# Patient Record
Sex: FEMALE | Race: WHITE | NOT HISPANIC OR LATINO | Employment: UNEMPLOYED | URBAN - METROPOLITAN AREA
[De-identification: names, ages, dates, MRNs, and addresses within clinical notes are randomized per-mention and may not be internally consistent; named-entity substitution may affect disease eponyms.]

---

## 2018-09-21 ENCOUNTER — TRANSFERRED RECORDS (OUTPATIENT)
Dept: HEALTH INFORMATION MANAGEMENT | Facility: CLINIC | Age: 63
End: 2018-09-21

## 2018-09-26 ENCOUNTER — TRANSFERRED RECORDS (OUTPATIENT)
Dept: HEALTH INFORMATION MANAGEMENT | Facility: CLINIC | Age: 63
End: 2018-09-26

## 2018-10-05 ENCOUNTER — TRANSFERRED RECORDS (OUTPATIENT)
Dept: HEALTH INFORMATION MANAGEMENT | Facility: CLINIC | Age: 63
End: 2018-10-05

## 2018-10-19 ENCOUNTER — TRANSFERRED RECORDS (OUTPATIENT)
Dept: HEALTH INFORMATION MANAGEMENT | Facility: CLINIC | Age: 63
End: 2018-10-19

## 2018-11-05 ENCOUNTER — OFFICE VISIT (OUTPATIENT)
Dept: SURGERY | Facility: CLINIC | Age: 63
End: 2018-11-05
Payer: COMMERCIAL

## 2018-11-05 VITALS
WEIGHT: 112 LBS | TEMPERATURE: 99.4 F | HEART RATE: 100 BPM | HEIGHT: 66 IN | SYSTOLIC BLOOD PRESSURE: 140 MMHG | DIASTOLIC BLOOD PRESSURE: 86 MMHG | BODY MASS INDEX: 18 KG/M2

## 2018-11-05 DIAGNOSIS — D05.11 DUCTAL CARCINOMA IN SITU (DCIS) OF RIGHT BREAST: Primary | ICD-10-CM

## 2018-11-05 PROCEDURE — 99205 OFFICE O/P NEW HI 60 MIN: CPT | Performed by: SURGERY

## 2018-11-05 NOTE — LETTER
11/5/2018         RE: Jackie Urias  [address] [place] [address]        Dear Colleague,    Thank you for referring your patient, Jackie Urias, to the [facility]. Please see a copy of my visit note below.    PCP:  Bernice Dennis    Chief complaint: Ductal carcinoma in situ right breast    History of Present Illness: Patient is a 62-year-old female has an interesting and difficult history. Approximately 25 years ago she had bilateral saline breast implants.    Nine years ago she had breast cancer on the left side which was treated with lumpectomy, radiation, and chemotherapy because she had a positive lymph node. Axillary dissection was not done if I recall correctly.    Recently she had a screening mammogram done with the finding of a grouping of microcalcifications in the right breast upper outer quadrant. Biopsy showed a ductal carcinoma in situ once again. She had surgery at [facility]. The first lumpectomy specimen came back with positive medial and deep margins.    A second excision was done of the entire biopsy cavity, but unfortunately the deep margin is still positive. I was able to review all of the notes. It appears that the deep margin is actually right on the capsule of the implant.    The patient came to this institution for another opinion. She was a little confused as to what had happened to her up to this point.    So far, there has been no invasive cancer noted.    An MRI scan of the breast was done prior to her first operation. This suggested some disease medially, but later this possibility was discounted.    The patient would like to avoid mastectomy if at all possible.    She does have a family history of breast cancer. Her mother, maternal uncle, and maternal aunt all had breast cancer. She has not been on previous abnormal and replacement she has had radiation on the left side.    Histories:  History reviewed. No pertinent past medical history.  "    Medical history remarkable for atopic dermatitis, fibromyalgia, gastroesophageal reflux disease, bilateral breast implants and irritable bowel syndrome    History reviewed. No pertinent surgical history.     Surgical history remarkable for hernia repair, carpal tunnel release, left lumpectomy, and right lumpectomy ×2.        Family History   Problem Relation Age of Onset     Breast Cancer Mother        Social History   Substance Use Topics     Smoking status: Never Smoker     Smokeless tobacco: Never Used     Alcohol use Yes      Comment: socially/once monthly       No current outpatient prescriptions on file.       No Known Allergies    Images:  No results found for this or any previous visit (from the past 744 hour(s)).    Labs:  No results found for this or any previous visit.    ROS:  Constitutional - Denies fevers, weight loss, malaise, lethargy  Neuro - Denies tremors or seizures  Pulmon - Denies SOB, dyspnea, hemoptysis, chronic cough or use of an inhaler  CV - Denies CP, SOB, lower extremity edema, difficulty w/ stairs, has never used NTG  GI - Denies hematemesis, BRBPR, melena, chronic diarrhea or epigastric pain   - Denies hematuria, difficulty voiding, h/o STDs  Hematology - Denies blood clotting disorders, chronic anemias  Dermatology - No melanomas or skin cancers  Rheumatology - No h/o RA  Pysch - Denies depression, bipolar d/o or schizophrenia    /86 (BP Location: Right arm, Patient Position: Sitting, Cuff Size: Adult Regular)  Pulse 100  Temp 99.4  F (37.4  C) (Tympanic)  Ht 1.676 m (5' 6\")  Wt 50.8 kg (112 lb)  BMI 18.08 kg/m2    Exam:  General - Alert and Oriented X4, NAD, well nourished  HEENT - Normocephalic, atraumatic  Neck - supple, no LAD  Lungs - respirations unlabored, chest wall excursion normal  CV - Heart RRR  Right breast - healing surgical incision circumareolar on the right 10:00 position,  no masses palpated, implant palpable  Abdomen - Soft, non-tender, +BS  Groins " -deferred   Rectal -deferred  Neuro - Full ROM, Strength 5/5 and major muscle groups, sensation intact  Extremities - No cyanosis, clubbing or edema.      Assessment and Plan: Difficult situation in this 62-year-old female. The problem primarily is that she still has a positive margin and this margin is adjacent to her implant. As mentioned, there is no invasive component noted. She needs excision of this area and subsequent radiation, or mastectomy with reconstruction.    I don't have any formal recommendations for her other than wanting presenter to the breast conference this week and get the opinion of the other members of the team. We are obtaining pathology slides and having the images pushed to our institution for review.    I have asked the patient to return in one week for the results of our discussions and potential he to schedule her surgery we choose. I think, also, that she should probably meet with the plastic surgeon before any definitive decision is made.    Time spent with patient today 60 minutes 100% of it spent in face-to-face consultation.    Wesly Strauss MD FACS        Wesly Strauss MD             Again, thank you for allowing me to participate in the care of your patient.        Sincerely,        Wesly Strauss MD

## 2018-11-05 NOTE — MR AVS SNAPSHOT
"              After Visit Summary   11/5/2018    Jackie Urias    MRN: [id number]           Patient Information     Date Of Birth          1955        Visit Information        Provider Department      11/5/2018 2:15 PM Wesly Strauss MD [facility]        Today's Diagnoses     Ductal carcinoma in situ (DCIS) of right breast    -  1      Care Instructions    Per Physician's instructions            Follow-ups after your visit        Your next 10 appointments already scheduled     Nov 12, 2018  2:00 PM CST   Return Visit with Wesly Strauss MD   [facility] ([facility]    [address] [facility] [address]   [phone]            Nov 14, 2018 10:20 AM CST   Pre-Op physical with Terrell Farias MD   [facility]    [address] [id number] [phone]              Who to contact     If you have questions or need follow up information about today's clinic visit or your schedule please contact [facility] directly at [phone].  Normal or non-critical lab and imaging results will be communicated to you by Tequila Mobilehart, letter or phone within 4 business days after the clinic has received the results. If you do not hear from us within 7 days, please contact the clinic through Tequila Mobilehart or phone. If you have a critical or abnormal lab result, we will notify you by phone as soon as possible.  Submit refill requests through [employer] or call your pharmacy and they will forward the refill request to us. Please allow 3 business days for your refill to be completed.          Additional Information About Your Visit        MyChart Information     [employer] lets you send messages to your doctor, view your test results, renew your prescriptions, schedule appointments and more. To sign up, go to [email or web address] . Click on \"Log in\" on the left side of the screen, which will take you to the " "Welcome page. Then click on \"Sign up Now\" on the right side of the page.     You will be asked to enter the access code listed below, as well as some personal information. Please follow the directions to create your username and password.     Your access code is: [email or web address]  Expires: 2/3/2019  5:15 PM     Your access code will  in 90 days. If you need help or a new code, please call your [place] clinic or [phone].        Care EveryWhere ID     This is your Care EveryWhere ID. This could be used by other organizations to access your [place] medical records  [id number]        Your Vitals Were     Pulse Temperature Height BMI (Body Mass Index)          100 99.4  F (37.4  C) (Tympanic) 1.676 m (5' 6\") 18.08 kg/m2         Blood Pressure from Last 3 Encounters:   18 140/86    Weight from Last 3 Encounters:   18 50.8 kg (112 lb)              Today, you had the following     No orders found for display       Primary Care Provider Office Phone # Fax #    Bernice MARISCAL Sonny [phone]       [facility] [address]  [facility] [address]        Equal Access to Services     MARBIN FONTENOT AH: Hadii aad ku hadasho Soomaali, waaxda luqadaha, qaybta kaalmada adeegyada, waxay samarain hayandrein humberto amanda. So [facility] [phone].    ATENCIÓN: Si habla español, tiene a mathew disposición servicios gratuitos de asistencia lingüística. Llame al [phone].    We comply with applicable federal civil rights laws and [place] laws. We do not discriminate on the basis of race, color, national origin, age, disability, sex, sexual orientation, or gender identity.            Thank you!     Thank you for choosing [facility]  for your care. Our goal is always to provide you with excellent care. Hearing back from our patients is one way we can continue to improve our services. Please take a few minutes to complete the written survey that you may receive in the mail after " your visit with us. Thank you!             Your Updated Medication List - Protect others around you: Learn how to safely use, store and throw away your medicines at www.disposemymeds.org.      Notice  As of 11/5/2018  5:15 PM    You have not been prescribed any medications.

## 2018-11-05 NOTE — PROGRESS NOTES
PCP:  Bernice Dennis    Chief complaint: Ductal carcinoma in situ right breast    History of Present Illness: Patient is a 62-year-old female has an interesting and difficult history. Approximately 25 years ago she had bilateral saline breast implants.    Nine years ago she had breast cancer on the left side which was treated with lumpectomy, radiation, and chemotherapy because she had a positive lymph node. Axillary dissection was not done if I recall correctly.    Recently she had a screening mammogram done with the finding of a grouping of microcalcifications in the right breast upper outer quadrant. Biopsy showed a ductal carcinoma in situ once again. She had surgery at [facility]. The first lumpectomy specimen came back with positive medial and deep margins.    A second excision was done of the entire biopsy cavity, but unfortunately the deep margin is still positive. I was able to review all of the notes. It appears that the deep margin is actually right on the capsule of the implant.    The patient came to this institution for another opinion. She was a little confused as to what had happened to her up to this point.    So far, there has been no invasive cancer noted.    An MRI scan of the breast was done prior to her first operation. This suggested some disease medially, but later this possibility was discounted.    The patient would like to avoid mastectomy if at all possible.    She does have a family history of breast cancer. Her mother, maternal uncle, and maternal aunt all had breast cancer. She has not been on previous abnormal and replacement she has had radiation on the left side.    Histories:  History reviewed. No pertinent past medical history.     Medical history remarkable for atopic dermatitis, fibromyalgia, gastroesophageal reflux disease, bilateral breast implants and irritable bowel syndrome    History reviewed. No pertinent surgical history.     Surgical history remarkable for  "hernia repair, carpal tunnel release, left lumpectomy, and right lumpectomy ×2.        Family History   Problem Relation Age of Onset     Breast Cancer Mother        Social History   Substance Use Topics     Smoking status: Never Smoker     Smokeless tobacco: Never Used     Alcohol use Yes      Comment: socially/once monthly       No current outpatient prescriptions on file.       No Known Allergies    Images:  No results found for this or any previous visit (from the past 744 hour(s)).    Labs:  No results found for this or any previous visit.    ROS:  Constitutional - Denies fevers, weight loss, malaise, lethargy  Neuro - Denies tremors or seizures  Pulmon - Denies SOB, dyspnea, hemoptysis, chronic cough or use of an inhaler  CV - Denies CP, SOB, lower extremity edema, difficulty w/ stairs, has never used NTG  GI - Denies hematemesis, BRBPR, melena, chronic diarrhea or epigastric pain   - Denies hematuria, difficulty voiding, h/o STDs  Hematology - Denies blood clotting disorders, chronic anemias  Dermatology - No melanomas or skin cancers  Rheumatology - No h/o RA  Pysch - Denies depression, bipolar d/o or schizophrenia    /86 (BP Location: Right arm, Patient Position: Sitting, Cuff Size: Adult Regular)  Pulse 100  Temp 99.4  F (37.4  C) (Tympanic)  Ht 1.676 m (5' 6\")  Wt 50.8 kg (112 lb)  BMI 18.08 kg/m2    Exam:  General - Alert and Oriented X4, NAD, well nourished  HEENT - Normocephalic, atraumatic  Neck - supple, no LAD  Lungs - respirations unlabored, chest wall excursion normal  CV - Heart RRR  Right breast - healing surgical incision circumareolar on the right 10:00 position,  no masses palpated, implant palpable  Abdomen - Soft, non-tender, +BS  Groins -deferred   Rectal -deferred  Neuro - Full ROM, Strength 5/5 and major muscle groups, sensation intact  Extremities - No cyanosis, clubbing or edema.      Assessment and Plan: Difficult situation in this 62-year-old female. The problem " primarily is that she still has a positive margin and this margin is adjacent to her implant. As mentioned, there is no invasive component noted. She needs excision of this area and subsequent radiation, or mastectomy with reconstruction.    I don't have any formal recommendations for her other than wanting presenter to the breast conference this week and get the opinion of the other members of the team. We are obtaining pathology slides and having the images pushed to our institution for review.    I have asked the patient to return in one week for the results of our discussions and potential he to schedule her surgery we choose. I think, also, that she should probably meet with the plastic surgeon before any definitive decision is made.    Time spent with patient today 60 minutes 100% of it spent in face-to-face consultation.    Wesly Strauss MD FACS        Wesly Strauss MD

## 2018-11-05 NOTE — NURSING NOTE
"Initial /86 (BP Location: Right arm, Patient Position: Sitting, Cuff Size: Adult Regular)  Pulse 100  Temp 99.4  F (37.4  C) (Tympanic)  Ht 1.676 m (5' 6\")  Wt 50.8 kg (112 lb)  BMI 18.08 kg/m2 Estimated body mass index is 18.08 kg/(m^2) as calculated from the following:    Height as of this encounter: 1.676 m (5' 6\").    Weight as of this encounter: 50.8 kg (112 lb). .    Adriana Calloway CMA    "

## 2018-11-08 ENCOUNTER — TELEPHONE (OUTPATIENT)
Dept: DERMATOLOGY | Facility: CLINIC | Age: 63
End: 2018-11-08

## 2018-11-08 NOTE — TELEPHONE ENCOUNTER
----- Message from DAVEY Santoro MD sent at 11/7/2018  5:34 PM CST -----  Regarding: RE: breast case  [place] I see her and talk to her.     I personally think she'd do best with a mastectomy and recon, saves the XRT and will give a more aesthetic outcome given the extent etc. But let me talk to her and examine her and then let's plan accordingly.     C/J, please add on ASAP.    Thanks!    Manan  ----- Message -----     From: Wesly Strauss MD     Sent: 11/7/2018  12:19 PM       To: DAVEY Santoro MD  Subject: breast case                                      Manan,    If you have a few spare minutes today, would you review this patient's record and give me a call?    There is probably not much in our system as she had her care in [place].    Briefly, she has bilateral saline implants from 25 years ago and has DCIS with positive margins after two resections.  The deep margin is positive and appears to be at the level of the capsule.  No invasive disease, yet.    She has a strong family history of breast cancer (including one male uncle) as well.    I'm presenting her case to our breast conference tomorrow, and I'd like your input today, if possible.    Cell # [phone].  I may or may not be in the office when you call, so use the cell.    Thanks    Wilian

## 2018-11-08 NOTE — TELEPHONE ENCOUNTER
Received a staff message from  that okay to schedule at the [facility]. RN called pt to notify that [facility] staff would be calling to schedule pt but then pt states after hanging up the phone with the RN pt states that she would actually like to come in [facility]. Pt scheduled next Tuesday 11/13/18 at 1:40. Address given and pt advised when arrive to clinic go to check in Desk B. Pt states understanding...Paty Gorman RN

## 2018-11-08 NOTE — TELEPHONE ENCOUNTER
Received a staff message from  on 11/7  to call this pt to schedule an appt with him asap. Pt called and states that due to her location she would prefer an appt at the [facility] if possible. RN notified pt that RN would update  to see if this is possible and call pt back. Staff message sent to  today....Paty Gorman RN

## 2018-11-12 ENCOUNTER — OFFICE VISIT (OUTPATIENT)
Dept: SURGERY | Facility: CLINIC | Age: 63
End: 2018-11-12
Payer: COMMERCIAL

## 2018-11-12 VITALS
DIASTOLIC BLOOD PRESSURE: 90 MMHG | HEIGHT: 66 IN | BODY MASS INDEX: 18 KG/M2 | SYSTOLIC BLOOD PRESSURE: 141 MMHG | WEIGHT: 112 LBS | TEMPERATURE: 98.4 F | HEART RATE: 94 BPM

## 2018-11-12 DIAGNOSIS — D05.11 DUCTAL CARCINOMA IN SITU (DCIS) OF RIGHT BREAST: Primary | ICD-10-CM

## 2018-11-12 PROCEDURE — 99214 OFFICE O/P EST MOD 30 MIN: CPT | Performed by: SURGERY

## 2018-11-12 RX ORDER — NAPROXEN SODIUM 220 MG
220 TABLET ORAL PRN
COMMUNITY

## 2018-11-12 NOTE — MR AVS SNAPSHOT
"              After Visit Summary   11/12/2018    Jackie Urias    MRN: [id number]           Patient Information     Date Of Birth          1955        Visit Information        Provider Department      11/12/2018 2:00 PM Wesly Strauss MD [facility]        Today's Diagnoses     Ductal carcinoma in situ (DCIS) of right breast    -  1      Care Instructions    Per Physician's instructions            Follow-ups after your visit        Your next 10 appointments already scheduled     Nov 13, 2018  1:40 PM CST   New Visit with DAVEY Santoro MD   [facility] ([facility])    [address] [place] [address]   [phone]            Nov 14, 2018 10:20 AM CST   Pre-Op physical with Terrell Farias MD   [facility]    [address] [place] [address] [phone]              Who to contact     If you have questions or need follow up information about today's clinic visit or your schedule please contact [facility] directly at [phone].  Normal or non-critical lab and imaging results will be communicated to you by MyChart, letter or phone within 4 business days after the clinic has received the results. If you do not hear from us within 7 days, please contact the clinic through Br[employer] or phone. If you have a critical or abnormal lab result, we will notify you by phone as soon as possible.  Submit refill requests through Xi3 or call your pharmacy and they will forward the refill request to us. Please allow 3 business days for your refill to be completed.          Additional Information About Your Visit        MyChart Information     Xi3 lets you send messages to your doctor, view your test results, renew your prescriptions, schedule appointments and more. To sign up, go to [email or web address] . Click on \"Log in\" on the left side of the screen, which will " "take you to the Welcome page. Then click on \"Sign up Now\" on the right side of the page.     You will be asked to enter the access code listed below, as well as some personal information. Please follow the directions to create your username and password.     Your access code is: [email or web address]  Expires: 2/3/2019  5:15 PM     Your access code will  in 90 days. If you need help or a new code, please call your [place] clinic or [phone].        Care EveryWhere ID     This is your Care EveryWhere ID. This could be used by other organizations to access your [place] medical records  [id number]        Your Vitals Were     Pulse Temperature Height BMI (Body Mass Index)          94 98.4  F (36.9  C) (Oral) 1.676 m (5' 6\") 18.08 kg/m2         Blood Pressure from Last 3 Encounters:   18 141/90   18 140/86    Weight from Last 3 Encounters:   18 50.8 kg (112 lb)   18 50.8 kg (112 lb)              Today, you had the following     No orders found for display       Primary Care Provider Office Phone # Fax #    Bernice MARISCAL Deanalamodesto [phone]       [facility] [address]  [facility] [address]        Equal Access to Services     MARBIN FONTENOT : Hadii brent murphy hadasho Soomaali, waaxda luqadaha, qaybta kaalmada adeegyada, donovan amanda. So [facility] [phone].    ATENCIÓN: Si habla español, tiene a mathew disposición servicios gratuitos de asistencia lingüística. Llame al [phone].    We comply with applicable federal civil rights laws and [place] laws. We do not discriminate on the basis of race, color, national origin, age, disability, sex, sexual orientation, or gender identity.            Thank you!     Thank you for choosing [facility]  for your care. Our goal is always to provide you with excellent care. Hearing back from our patients is one way we can continue to improve our services. Please take a few minutes to complete " the written survey that you may receive in the mail after your visit with us. Thank you!             Your Updated Medication List - Protect others around you: Learn how to safely use, store and throw away your medicines at www.disposemymeds.org.          This list is accurate as of 11/12/18  3:32 PM.  Always use your most recent med list.                   Brand Name Dispense Instructions for use Diagnosis    ALEVE 220 MG tablet   Generic drug:  naproxen sodium      Take 220 mg by mouth as needed for moderate pain

## 2018-11-12 NOTE — LETTER
11/12/2018         RE: Jackie Urias  [address]  [place] [address]        Dear Colleague,    Thank you for referring your patient, Jackie Urias, to the [facility]. Please see a copy of my visit note below.    Jackie returns for a visit. Since her last visit I have been able to review images although just recently. Also, I had the opportunity to present her case to our breast conference. This presentation was somewhat limited because sides and x-rays were not available.    In the meantime, I have been in contact with our plastic surgeon Dr. Santoro.  Without seeing the patient, he has stated that he think she would be better served by a mastectomy as opposed to any type of reexcision lumpectomy.    I spent 30 minutes with her talking about the information that I have gleaned, the conversations are patent, and the plan going forward.    She will be seen by the plastic surgeon tomorrow, and then we can start putting together plan. We will also be discussing her at our next breast cancer conference.    The patient asked whether it might be possible to delay final surgical treatment until after the holidays. From my standpoint I don't see why we can't make those plans.    I will make an appointment for her to see me at this point. I will await 's opinion.    Wesly Strauss MD FACS    Again, thank you for allowing me to participate in the care of your patient.        Sincerely,        Wesly Strauss MD

## 2018-11-12 NOTE — PROGRESS NOTES
Jackie returns for a visit. Since her last visit I have been able to review images although just recently. Also, I had the opportunity to present her case to our breast conference. This presentation was somewhat limited because sides and x-rays were not available.    In the meantime, I have been in contact with our plastic surgeon Dr. Santoro.  Without seeing the patient, he has stated that he think she would be better served by a mastectomy as opposed to any type of reexcision lumpectomy.    I spent 30 minutes with her talking about the information that I have gleaned, the conversations are patent, and the plan going forward.    She will be seen by the plastic surgeon tomorrow, and then we can start putting together plan. We will also be discussing her at our next breast cancer conference.    The patient asked whether it might be possible to delay final surgical treatment until after the holidays. From my standpoint I don't see why we can't make those plans.    I will make an appointment for her to see me at this point. I will await 's opinion.    Wesly Strauss MD FACS

## 2018-11-13 ENCOUNTER — OFFICE VISIT (OUTPATIENT)
Dept: ORTHOPEDICS | Facility: CLINIC | Age: 63
End: 2018-11-13
Payer: COMMERCIAL

## 2018-11-13 DIAGNOSIS — C50.911 MALIGNANT NEOPLASM OF RIGHT FEMALE BREAST, UNSPECIFIED ESTROGEN RECEPTOR STATUS, UNSPECIFIED SITE OF BREAST (H): ICD-10-CM

## 2018-11-13 DIAGNOSIS — C50.911 MALIGNANT NEOPLASM OF RIGHT FEMALE BREAST, UNSPECIFIED ESTROGEN RECEPTOR STATUS, UNSPECIFIED SITE OF BREAST (H): Primary | ICD-10-CM

## 2018-11-13 PROCEDURE — 99203 OFFICE O/P NEW LOW 30 MIN: CPT | Performed by: PLASTIC SURGERY

## 2018-11-13 RX ORDER — CEFAZOLIN SODIUM 2 G/50ML
2 SOLUTION INTRAVENOUS
Status: CANCELLED | OUTPATIENT
Start: 2018-11-13

## 2018-11-13 RX ORDER — CEFAZOLIN SODIUM 1 G/50ML
1 INJECTION, SOLUTION INTRAVENOUS SEE ADMIN INSTRUCTIONS
Status: CANCELLED | OUTPATIENT
Start: 2018-11-13

## 2018-11-13 NOTE — LETTER
11/13/2018         RE: Jackie Urias  [address]  [place] [address]        Dear Colleague,    Thank you for referring your patient, Jackie Urias, to the [facility]. Please see a copy of my visit note below.    CONSULTATION NOTE      REFERRING PHYSICIAN:  Wesly Strauss MD       PRESENTING COMPLAINT:  Consultation for breast reconstruction.      HISTORY OF PRESENTING COMPLAINT:  Ms. Urias is 62 years old.  She has been diagnosed with right-sided DCIS.  Has had 2 lumpectomies but still has not cleared her margins.  Next step is most likely a nipple non-sparing mastectomy and the patient is interested in breast reconstruction.  Incidentally, she was diagnosed with left-sided breast cancer 9 years ago and underwent lumpectomy and radiation therapy and chemotherapy.  Additionally, she has also had breast augmentation 20 years ago bilaterally and currently has saline implants in place bilaterally.  She wears a C-cup bra.  She would like to be around the same size.  She lives a relatively active lifestyle and has a BMI of 18.      PAST MEDICAL HISTORY:  Nil.      PAST SURGICAL HISTORY:  Lumpectomies as per HPI, left-sided axillary node dissection, carpal tunnel release and inguinal hernia repair.      MEDICATIONS:  Naprosyn.      ALLERGIES:  Nil.      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      REVIEW OF SYSTEMS:  Denies chest pain, shortness of breath, MI, CVA, DVT and PE.      PHYSICAL EXAMINATION:  Vital signs stable.  She is afebrile, in no obvious distress.  She is 5 feet 6 inches, 112 pounds, BMI of 18 kg/m2.  On examination of her breasts, her right breast has grade 3 capsular contracture, periareolar incision that is healing, grade 1 ptosis.  Left breast has grade 1 ptosis/grade 2 ptosis, has grade 4 capsular contracture, basically has a rock-in-a-sock pattern.  She has minimal soft tissues in her abdomen.  No obvious hernias.  No scars on her back.  She  has a mild radiation changes in the left breast, severe capsular contracture on the left breast.      ASSESSMENT AND PLAN:  Based on the findings, a diagnosis of right breast DCIS with positive margins and left breast history of cancer with radiation along with severe capsular contracture and previously placed implants was made.  Bilateral nipple non-sparing mastectomies have been decided.  The patient is interested in breast reconstruction.  My concerns are the fact that she has had radiation in the past on the left side.  She is an extremely thin person with very thinned out soft tissues, especially on the left side.  She will need volume with implants but her soft tissue coverage is not stable, especially on the left.  My recommendations are to do a latissimus dorsi musculocutaneous flap bilaterally to help with soft tissue coverage, placement of an expander at the time of the mastectomy and reconstruction and then at the staged procedure do implant placement.  Another option would be to do delayed immediate reconstruction, i.e., do the mastectomy first, close up over a drain and in a few days proceed with a latissimus flaps and expander placements.  This will help with the logistics of planning the surgery at 2 different facilities as well as the fact that we would see how her skin fares over the ensuing few days to plan whether a musculocutaneous flap is needed or a plain muscular flap is done as I do not have a SPY angiogram available at [facility].  The patient is not interested in option 2.  We, therefore, can plan option 1 without the use of a SPY angiogram and, thus, be prepared to remove any unstable, potentially clinically non-usable skin and use a musculocutaneous flap instead of the native skin if necessary.  I went over the planned procedure with her.  All risks, benefits, alternatives of the procedure including pain, infection, bleeding, scarring, asymmetry, seromas, hematomas, wound breakdown,  wound dehiscence, loss of the flaps, requirement of further surgeries, staged nature of the reconstruction, asymmetries, injury to deeper structures like nerves, vessels and chest wall, DVT, PE, MI, CVA, pneumonia, renal failure and death.  She understood it all and wants to proceed.  I will discuss her case with Dr. Strauss.  We will plan this sometime in January as the patient wants to wait until then anyway.  All questions were answered.  She is happy with the visit.  I look forward to helping her out in the near future.      Total time spent with patient 30 minutes, more than half was counseling.      cc:   Wesly Strauss MD   [facility]   [address] [place]  [address]         Again, thank you for allowing me to participate in the care of your patient.        Sincerely,        DAVEY Santoro MD

## 2018-11-13 NOTE — NURSING NOTE
Jackie Urias's chief complaint for this visit includes:  Chief Complaint   Patient presents with     Consult     PCP: Bernice Dennis    Referring Provider:  No referring provider defined for this encounter.    There were no vitals taken for this visit.  Data Unavailable

## 2018-11-13 NOTE — PROGRESS NOTES
CONSULTATION NOTE      REFERRING PHYSICIAN:  Wesly Strauss MD       PRESENTING COMPLAINT:  Consultation for breast reconstruction.      HISTORY OF PRESENTING COMPLAINT:  Ms. Urias is 62 years old.  She has been diagnosed with right-sided DCIS.  Has had 2 lumpectomies but still has not cleared her margins.  Next step is most likely a nipple non-sparing mastectomy and the patient is interested in breast reconstruction.  Incidentally, she was diagnosed with left-sided breast cancer 9 years ago and underwent lumpectomy and radiation therapy and chemotherapy.  Additionally, she has also had breast augmentation 20 years ago bilaterally and currently has saline implants in place bilaterally.  She wears a C-cup bra.  She would like to be around the same size.  She lives a relatively active lifestyle and has a BMI of 18.      PAST MEDICAL HISTORY:  Nil.      PAST SURGICAL HISTORY:  Lumpectomies as per HPI, left-sided axillary node dissection, carpal tunnel release and inguinal hernia repair.      MEDICATIONS:  Naprosyn.      ALLERGIES:  Nil.      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      REVIEW OF SYSTEMS:  Denies chest pain, shortness of breath, MI, CVA, DVT and PE.      PHYSICAL EXAMINATION:  Vital signs stable.  She is afebrile, in no obvious distress.  She is 5 feet 6 inches, 112 pounds, BMI of 18 kg/m2.  On examination of her breasts, her right breast has grade 3 capsular contracture, periareolar incision that is healing, grade 1 ptosis.  Left breast has grade 1 ptosis/grade 2 ptosis, has grade 4 capsular contracture, basically has a rock-in-a-sock pattern.  She has minimal soft tissues in her abdomen.  No obvious hernias.  No scars on her back.  She has a mild radiation changes in the left breast, severe capsular contracture on the left breast.      ASSESSMENT AND PLAN:  Based on the findings, a diagnosis of right breast DCIS with positive margins and left breast history of cancer with  radiation along with severe capsular contracture and previously placed implants was made.  Bilateral nipple non-sparing mastectomies have been decided.  The patient is interested in breast reconstruction.  My concerns are the fact that she has had radiation in the past on the left side.  She is an extremely thin person with very thinned out soft tissues, especially on the left side.  She will need volume with implants but her soft tissue coverage is not stable, especially on the left.  My recommendations are to do a latissimus dorsi musculocutaneous flap bilaterally to help with soft tissue coverage, placement of an expander at the time of the mastectomy and reconstruction and then at the staged procedure do implant placement.  Another option would be to do delayed immediate reconstruction, i.e., do the mastectomy first, close up over a drain and in a few days proceed with a latissimus flaps and expander placements.  This will help with the logistics of planning the surgery at 2 different facilities as well as the fact that we would see how her skin fares over the ensuing few days to plan whether a musculocutaneous flap is needed or a plain muscular flap is done as I do not have a SPY angiogram available at [facility].  The patient is not interested in option 2.  We, therefore, can plan option 1 without the use of a SPY angiogram and, thus, be prepared to remove any unstable, potentially clinically non-usable skin and use a musculocutaneous flap instead of the native skin if necessary.  I went over the planned procedure with her.  All risks, benefits, alternatives of the procedure including pain, infection, bleeding, scarring, asymmetry, seromas, hematomas, wound breakdown, wound dehiscence, loss of the flaps, requirement of further surgeries, staged nature of the reconstruction, asymmetries, injury to deeper structures like nerves, vessels and chest wall, DVT, PE, MI, CVA, pneumonia, renal failure and death.   She understood it all and wants to proceed.  I will discuss her case with Dr. Strauss.  We will plan this sometime in January as the patient wants to wait until then anyway.  All questions were answered.  She is happy with the visit.  I look forward to helping her out in the near future.      Total time spent with patient 30 minutes, more than half was counseling.      cc:   Wesly Strauss MD   [facility]   [address] [place]  [address]

## 2018-11-13 NOTE — MR AVS SNAPSHOT
After Visit Summary   2018    Jackie Urias    MRN: [id number]           Patient Information     Date Of Birth          1955        Visit Information        Provider Department      2018 1:40 PM DAVEY Santoro MD [facility]        Today's Diagnoses     Malignant neoplasm of right female breast, unspecified estrogen receptor status, unspecified site of breast (H)          Care Instructions    Thanks for coming today.  Ortho[facility] Medicine Clinic  [address] [place] [address]    To schedule future appointments in Ortho Clinic, you may call [phone].    To schedule ordered imaging by your provider:   Call Central Imaging [phone]    To schedule an injection ordered by your provider:  Call Central Imaging Injection scheduling line: [phone]  MyChart available online at:  [email or web address]    Please call if any further questions or concerns ([phone]).  Clinic hours 8 am to 5 pm.    Return to clinic (call) if symptoms worsen or fail to improve.          Follow-ups after your visit        Follow-up notes from your care team     Return if symptoms worsen or fail to improve.      Your next 10 appointments already scheduled     2019  1:00 PM CST   Return Visit with DAVEY Santoro MD   [facility] ([facility])    [address] [place] [id number] [phone]              Who to contact     If you have questions or need follow up information about today's clinic visit or your schedule please contact [facility] directly at [phone].  Normal or non-critical lab and imaging results will be communicated to you by MyChart, letter or phone within 4 business days after the clinic has received the results. If you do not hear from us within 7 days, please contact the clinic through MyChart or phone. If you have a critical or abnormal lab  result, we will notify you by phone as soon as possible.  Submit refill requests through [employer] or call your pharmacy and they will forward the refill request to us. Please allow 3 business days for your refill to be completed.          Additional Information About Your Visit        [employer] Information     [employer] is an electronic gateway that provides easy, online access to your medical records. With [employer], you can request a clinic appointment, read your test results, renew a prescription or communicate with your care team.     To sign up for [employer] visit the website at [email or web address]   You will be asked to enter the access code listed below, as well as some personal information. Please follow the directions to create your username and password.     Your access code is: [email or web address]  Expires: 2/3/2019  5:15 PM     Your access code will  in 90 days. If you need help or a new code, please contact your [facility] or call [phone] for assistance.        Care EveryWhere ID     This is your Care EveryWhere ID. This could be used by other organizations to access your [facility] medical records  [id number]         Blood Pressure from Last 3 Encounters:   18 141/90   18 140/86    Weight from Last 3 Encounters:   18 112 lb   18 112 lb              Today, you had the following     No orders found for display       Primary Care Provider Office Phone # Fax #    Bernice MARISCAL Sonny [phone]       [facility] [address]  [facility] [address]        Equal Access to Services     [facility] PO AH: Hadii aad ku hadasho Soomaali, waaxda luqadaha, qaybta kaalmada adeegyada, donovan amanda. So [facility] [phone].    ATENCIÓN: Si habla español, tiene a mathew disposición servicios gratuitos de asistencia lingüística. Llame al [phone].    We comply with applicable federal civil rights laws and  [place] laws. We do not discriminate on the basis of race, color, national origin, age, disability, sex, sexual orientation, or gender identity.            Thank you!     Thank you for choosing [facility]  for your care. Our goal is always to provide you with excellent care. Hearing back from our patients is one way we can continue to improve our services. Please take a few minutes to complete the written survey that you may receive in the mail after your visit with us. Thank you!             Your Updated Medication List - Protect others around you: Learn how to safely use, store and throw away your medicines at www.disposemymeds.org.          This list is accurate as of 11/13/18 11:59 PM.  Always use your most recent med list.                   Brand Name Dispense Instructions for use Diagnosis    ALEVE 220 MG tablet   Generic drug:  naproxen sodium      Take 220 mg by mouth as needed for moderate pain

## 2018-11-13 NOTE — PATIENT INSTRUCTIONS
Thanks for coming today.  Ortho[facility] Medicine Clinic  [address] [place] [address]    To schedule future appointments in Ortho Clinic, you may call [phone].    To schedule ordered imaging by your provider:   Call Central Imaging [phone]    To schedule an injection ordered by your provider:  Call Central Imaging Injection scheduling line: [phone]  [employer] available online at:  [email or web address]    Please call if any further questions or concerns ([phone]).  Clinic hours 8 am to 5 pm.    Return to clinic (call) if symptoms worsen or fail to improve.

## 2018-11-20 ENCOUNTER — HOSPITAL ENCOUNTER (INPATIENT)
Facility: CLINIC | Age: 63
Setting detail: SURGERY ADMIT
End: 2018-11-20
Attending: SURGERY | Admitting: SURGERY
Payer: COMMERCIAL

## 2018-11-20 ENCOUNTER — TELEPHONE (OUTPATIENT)
Dept: SURGERY | Facility: CLINIC | Age: 63
End: 2018-11-20

## 2018-11-20 DIAGNOSIS — D05.11 DUCTAL CARCINOMA IN SITU (DCIS) OF RIGHT BREAST: Primary | ICD-10-CM

## 2018-11-20 PROBLEM — C50.911 MALIGNANT NEOPLASM OF RIGHT FEMALE BREAST (H): Status: RESOLVED | Noted: 2018-11-13 | Resolved: 2018-11-20

## 2018-11-20 PROCEDURE — 00000346 ZZHCL STATISTIC REVIEW OUTSIDE SLIDES TC 88321: Performed by: SURGERY

## 2018-11-20 NOTE — TELEPHONE ENCOUNTER
Left message for patient that we would like to do her Breast surgery on 1-16-19. Will have to call diagnostics to schedule sentinel node injection once confirmed ( we will need a Breast radiologist  and will call Velia at Dr. Santoro's office to confirm [phone].  Have tentatively scheduled surgery 1-16-19.

## 2018-11-21 LAB — COPATH REPORT: NORMAL

## 2018-11-26 ENCOUNTER — TELEPHONE (OUTPATIENT)
Dept: PLASTIC SURGERY | Facility: CLINIC | Age: 63
End: 2018-11-26

## 2018-11-26 NOTE — TELEPHONE ENCOUNTER
Informed that we need to move surgery to 1-23-19 per SDS. Will call Dr. Santoro's office and call breast diagnostics to reschedule.  Adriana Calloway CMA

## 2018-12-11 NOTE — TELEPHONE ENCOUNTER
Patient has decided to go elsewhere. She wanted to get her surgery done sooner than we could offer her.  Adriana Calloway CMA

## 2018-12-21 ENCOUNTER — TRANSFERRED RECORDS (OUTPATIENT)
Dept: HEALTH INFORMATION MANAGEMENT | Facility: CLINIC | Age: 63
End: 2018-12-21

## 2019-01-08 ENCOUNTER — TRANSFERRED RECORDS (OUTPATIENT)
Dept: HEALTH INFORMATION MANAGEMENT | Facility: CLINIC | Age: 64
End: 2019-01-08

## 2019-01-25 ENCOUNTER — TRANSFERRED RECORDS (OUTPATIENT)
Dept: HEALTH INFORMATION MANAGEMENT | Facility: CLINIC | Age: 64
End: 2019-01-25

## 2019-02-07 ENCOUNTER — OFFICE VISIT (OUTPATIENT)
Dept: RADIATION THERAPY | Facility: OUTPATIENT CENTER | Age: 64
End: 2019-02-07
Payer: COMMERCIAL

## 2019-02-07 VITALS
DIASTOLIC BLOOD PRESSURE: 77 MMHG | BODY MASS INDEX: 19.05 KG/M2 | OXYGEN SATURATION: 97 % | RESPIRATION RATE: 16 BRPM | SYSTOLIC BLOOD PRESSURE: 127 MMHG | WEIGHT: 118 LBS | HEART RATE: 90 BPM

## 2019-02-07 DIAGNOSIS — M79.7 FIBROMYALGIA: ICD-10-CM

## 2019-02-07 PROBLEM — C50.919 BREAST CANCER, STAGE 2 (H): Status: ACTIVE | Noted: 2018-11-13

## 2019-02-07 PROBLEM — D05.11 DUCTAL CARCINOMA IN SITU (DCIS) OF RIGHT BREAST: Status: ACTIVE | Noted: 2018-09-28

## 2019-02-07 PROBLEM — Z80.3 FAMILY HISTORY OF BREAST CANCER IN MALE: Status: ACTIVE | Noted: 2018-10-01

## 2019-02-07 PROBLEM — Z85.3 HISTORY OF LEFT BREAST CANCER: Status: ACTIVE | Noted: 2018-10-01

## 2019-02-07 RX ORDER — DIPHENOXYLATE HYDROCHLORIDE AND ATROPINE SULFATE 2.5; .025 MG/1; MG/1
1 TABLET ORAL
COMMUNITY

## 2019-02-07 NOTE — PROGRESS NOTES
Department of Radiation Oncology  Radiation Therapy Center  [facility]  [address], [address]  [place] [address]  [phone]       Consultation Note    Name: Jackie Urias MRN: [id number]   : 1955   Date of Service: 2019  Referring: Dr. Cox and Dr. 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 Evaluate role of adjuvant radiation therapy.     History of Present Illness   Ms. Urias is a 63 year old female with a history of left breast cancer stage T1cN1 status post lumpectomy axillary lymph node dissection followed by adjuvant systemic therapy and radiation therapy (completed )  now presenting with a newly diagnosed right breast ductal carcinoma in situ. the patient is now status post lumpectomy. Final pathology demonstrated ductal carcinoma in situ, intermediate grade, 3.2cm, ER+. The patient initially had multifocal positive margins and is now s/p 2 resections with final negative margins. She presents today to discuss the potential role of adjuvant radiation therapy.     The patient's history dates to  when she was treated for left breast cancer stage T1c N1a M0. She underwent  lumpectomy and axillary lymph node dissection with 1 of 15 removed lymph nodes positive for metastatic disease. Pathology demonstrated ER/NE positive, HER-2/chapin positive. She subsequently completed chemotherapy and adjuvant radiation therapy (6040 cGy in 33 fractions, completed on 2010).    Patient also continued on adjuvant tamoxifen and Herceptin.    More recently the patient underwent a screening mammogram in 2015 which demonstrated a new area of pleomorphic calcifications in the right breast at the 10 to 12 o'clock position.  On 2018 the patient underwent right breast biopsy of a suspicious lesion for 10  o'clock position final pathology demonstrated ductal carcinoma in situ, intermediate grade, ER positive.  On 10/19/2018 the patient underwent right breast lumpectomy by Dr. Mcdowell.  Final pathology demonstrated DCIS, grade 2, 3.2 cm in size, positive medial margin, less than 1 mm deep margin, no invasive disease.  On 10/25/2018 the patient underwent right breast reexcision.  The medial margin was negative for disease, deep margin was positive for DCIS.  On 1/22/2019 the patient underwent a second reexcision completed by Dr. Cox.  Final pathology demonstrated negative margins.  On 1/25/2019 the patient was seen by Dr. Garcia who discussed continuation of anti-endocrine therapy.  The patient presents today to discuss the potential role of adjuvant radiation therapy.    She is overall doing well.  No issues with range of motion.  No history of lymphedema.  No connective tissue disorder.  No pacemaker.  Prior history of radiation therapy to the left breast outlined above.    Past Medical History:   No past medical history on file.    Past Surgical History:   No past surgical history on file.    Chemotherapy History:  Per HPI    Radiation History:   Left breast cancer, stage T1c N1a M0, status post lumpectomy and axillary lymph node dissection with 1 of 15 removed lymph nodes positive for metastatic disease.  ER/SC positive, HER-2/chapin positive, status post chemotherapy and completed 6040 cGy in 33 fractions, completed on March 11, 2010.        Pregnant: Not Applicable  Implanted Cardiac Devices: No    Medications:  Current Outpatient Medications   Medication     naproxen sodium (ALEVE) 220 MG tablet     No current facility-administered medications for this visit.          Allergies:   No Known Allergies      Family History:  Family History   Problem Relation Age of Onset     Breast Cancer Mother        Review of Systems   A 10-point review of systems was performed. Pertinent findings are noted in the HPI.    Physical  "Exam   ECOG Status: 1    Vitals:  There were no vitals taken for this visit.    Gen: Alert, in NAD  Head: NC/AT  Eyes: PERRL, EOMI, sclera anicteric  Ears: No external auricular lesions  Nose/sinus: No rhinorrhea or epistaxis  Oral cavity/oropharynx: MMM, no visible oral cavity lesions, FOM and BOT are soft to palpation  Neck: Full ROM, supple, no palpable adenopathy  Pulm: No wheezing, stridor or respiratory distress  CV: Extremities are warm and well-perfused, no cyanosis, no pedal edema  Abdominal: Normal bowel sounds, soft, nontender, no masses  Musculoskeletal: Normal bulk and tone, no issues with ROM.   Lymph: No extremity lymphedema.   Skin: Normal color and turgor  Neuro: A/Ox3, CN II-XII intact, normal gait    Imaging/Path/Labs   Imagin18  INDICATION: Grouped microcalcifications on screening examination.  COMPARISON: 2018    MAMMOGRAPHIC FINDINGS: Right full-field digital diagnostic mammogram performed. The breast tissue is heterogeneously dense, which may obscure small masses. Spot magnification images in the craniocaudal and 90-degree mediolateral projection show the grouped calcifications are in a linear configuration and mildly pleomorphic extending from the 10 to 12 o'clock position of the right breast middle depth on the implant displaced views. These are indeterminate for malignancy and stereotactic core needle biopsy is recommended. Images evaluated with the assistance of CAD.    Path:     10/19/18  Received: 10/19/2018   Reported: 10/23/2018   Physician(s): ED YEBOAH                      Final Pathologic Diagnosis   A. Breast, right, partial mastectomy --       1.  Ductal carcinoma in situ (DCIS), cribriform and   micropapillary patterns, nuclear grade 2       2.  DCIS present at \"en face\" lateral margin, \"en face\" medial   margins and at <1 mm from deep margin       3.  No invasive carcinoma seen       4.  Prior biopsy site changes/cavity present       5.  Focal atypical " "lobular hyperplasia (ALH)       6.  Background nonproliferative fibrocystic change       7.  See DCIS cancer staging parameters and comments below   B. Breast, right, new lateral margin, excision --       1.  Benign breast parenchyma with nonproliferative fibrocystic   change       2.  No DCIS or invasive carcinoma seen   DCIS: Breast Cancer Staging Parameters   Specimen type: Right breast partial mastectomy with reexcision of   lateral margin   Tumor location: No grossly visible tumor   Multifocality: Unifocal   Primary Tumor:        Histologic type:  Ductal carcinoma in situ (DCIS)   Size (Extent) of DCIS: at least 3.2 cm (32 mm) (6 blocks of DCIS per   12 blocks examined), see comment       Histologic pattern and grade: Cribriform and micropapillary   patterns, nuclear grade 2       Associated necrosis: Not identified       Associated microcalcifications: Present in DCIS and in   nonneoplastic breast tissues       Angiolymphatic invasion: Not identified            Dermal/lymphatic invasion:  N/A       Margin status:  a) DCIS present at \"en face\" medial margin (A9),   B) DCIS present at 'en face\" lateral margin (A1) in specimen A (Final   (reexcised) lateral margin in specimen B - negative for DCIS)            Distance to nearest margin(s): Deep margin- <1 mm, rest of   the margins 5 mm or more (biopsy cavity at ~3 mm from superficial   margin)       Biopsy cavity: Present       Additional findings: a) focal atypical lobular hyperplasia, b)   nonproliferative fibrocystic change          Regional lymph nodes (pN): No regional lymph nodes submitted for   histopathologic examination        Total number of nodes:  0 lymph nodes including 0 sentinel   node(s)   Receptor Studies:  Immunohistochemistry performed on the needle core   biopsy, results listed below (please refer to case number [id number]   for complete report)       ER: High positive in DCIS   Pathologic stage (pTN) (AJCC/UICC Volume 8):  pTis(DCIS) " "NX    Comments   The medial to lateral dimension of the specimen is 3.2 cm.  Since both   medial and lateral\" en face\" margins are positive for DCIS, the DCIS   is estimated to be at least 3.2 cm.  The above findings are discussed     10/25/18  Final Pathologic Diagnosis   A.  Right breast, new medial margin, re-excision --       - Negative for residual ductal carcinoma in situ.       - Nonproliferative fibrocystic change and chronic inflammation   present.   B.  Right breast, new deep margin, re-excision --       - Ductal carcinoma in-situ, cribriform and micropapillary types   with comedonecrosis, high nuclear grade.       - DCIS involves 4 of 5 tissue blocks.       - DCIS present at the new en face deep margin.       - Negative for invasive malignancy.       - Proliferative fibrocystic change.     1/8/19  Final Pathologic Diagnosis   Breast, right, new posterior margin, excision --       1.  Adipose predominant benign breast parenchyma with focal   fibrosis and foreign body giant cell reaction       2.  No DCIS or invasive carcinoma seen       3.  A portion of skeletal muscle present       4.  Oriented reexcision margin free of malignancy         Assessment    Ms. Urias is a 63 year old female with a history of left breast cancer stage T1cN1 status post lumpectomy axillary lymph node dissection followed by adjuvant systemic therapy and radiation therapy (completed 2010)  now presenting with a newly diagnosed right breast ductal carcinoma in situ. the patient is now status post lumpectomy. Final pathology demonstrated ductal carcinoma in situ, intermediate grade, 3.2cm, ER+. The patient initially had multifocal positive margins and is now s/p 2 resections with final negative margins. She presents today to discuss the potential role of adjuvant radiation therapy.     Plan     Today, we discussed that in general, adjuvant whole breast radiotherapy is recommended as it has been demonstrated to reduce tumor " recurrence by 50%, which has been demonstrated in a  meta-analysis of DCIS patients treated with radiation over lumpectomy alone, despite no survival benefit  detected (EBCTCG, JNCI, 2010).  However, we also discussed that there is clinical evidence suggesting that highly selected patients with small, low to intermediate grade DCIS lesions can be offered observation after lumpectomy rather than radiation. In a prospective, randomized study by the ECOG, patients with low/intermediate grade DCIS, median tumor size 6mm, resected with margins >3mm had ipsilateral breast tumor recurrence rates of 6% at 5 years (Tanna, JCO, 2009). This is approximately a 1% recurrence rate per year. Another study RTOG 9804, also showed recurrence rates of approximately 1% per year without radiation in patients who had low risk DCIS (Cortney et al, JCO, 2015).  Though radiation did significantly reduce recurrence rates (greater than 50% relative reduction), the absolute benefit was small, decreasing from 6.4% to 0.9% at 7 years. Mrs. Urias would technically not have been a candidate for the mentioned trials given her large tumor size and margin status. As such, I would recommend adjuvant whole breast radiation therapy. I discussed that I would recommend whole breast radiation therapy to 4005 cGy in 15 fractions followed by sequential lumpectomy bed boost to 1000 cGy in 5 fractions. Although her final margin was negative, I would recommend boost given her history of multiple re-excisions due to multifocal positive margins.      Today we also discussed the logistics of treatment planning and delivery in detail with the patient. We also discussed side effects, including short term risks of fatigue and skin reaction, and long term risks of pneumonitis, lung fibrosis, soft tissue fibrosis, skin changes, breast contour changes, rib fractures, cardiac damage, secondary cancers, lymphedema, and thyroid dysfunction. We described the use of  3D-conformal radiotherapy to minimize dose to the normal tissues, while adequately covering the target tissues, and the ability of this technique to decrease potential for toxicity.    With regards to sparing of the heart, we discussed the use of deep inspiration breath hold for treatment planning and delivery. This method has been shown to significantly reduce dose to the lung and heart as compared to treatment with free breathing (Jordi et al, AJCO, 2012). We discussed that this will involve 3D-conformal treatment planning, with contouring of the heart, and that limiting heart dose will be a high priority for treatment planning, as will dose to the contralateral breast and lung.     The patient would like to think and discuss consideration of adjuvant radiation with her  before proceeding with treatment. She will let us know how she wishes to proceed, and  CT simulation will be scheduled accordingly. Of note, the patient has a scheduled trip from March 13-27th and ideally would prefer treatment to be completed prior to her trip. As such, we also discussed possibility of a 15-16 fraction regimen without boost as well.     Stepan Marie MD  Department of Radiation Oncology  [facility]

## 2019-02-07 NOTE — LETTER
2019      RE: Jackie Urias  [address]  [place] [address]          Department of Radiation Oncology  Radiation Therapy Center  [facility]  [address], [address]  [place] [address] [phone]     Consultation Note    Name: Jackie Urias MRN: [id number]   : 1955   Date of Service: 2019  Referring: Dr. Cox and Dr. 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 Evaluate role of adjuvant radiation therapy.     History of Present Illness   Ms. Urias is a 63 year old female with a history of left breast cancer stage T1cN1 status post lumpectomy axillary lymph node dissection followed by adjuvant systemic therapy and radiation therapy (completed )  now presenting with a newly diagnosed right breast ductal carcinoma in situ. the patient is now status post lumpectomy. Final pathology demonstrated ductal carcinoma in situ, intermediate grade, 3.2cm, ER+. The patient initially had multifocal positive margins and is now s/p 2 resections with final negative margins. She presents today to discuss the potential role of adjuvant radiation therapy.     The patient's history dates to  when she was treated for left breast cancer stage T1c N1a M0. She underwent  lumpectomy and axillary lymph node dissection with 1 of 15 removed lymph nodes positive for metastatic disease. Pathology demonstrated ER/MA positive, HER-2/chapin positive. She subsequently completed chemotherapy and adjuvant radiation therapy (6040 cGy in 33 fractions, completed on 2010).    Patient also continued on adjuvant tamoxifen and Herceptin.    More recently the patient underwent a screening mammogram in 2015 which demonstrated a new area of pleomorphic calcifications in the right breast at the 10 to 12 o'clock position.  On 2018  the patient underwent right breast biopsy of a suspicious lesion for 10 o'clock position final pathology demonstrated ductal carcinoma in situ, intermediate grade, ER positive.  On 10/19/2018 the patient underwent right breast lumpectomy by Dr. Mcdowell.  Final pathology demonstrated DCIS, grade 2, 3.2 cm in size, positive medial margin, less than 1 mm deep margin, no invasive disease.  On 10/25/2018 the patient underwent right breast reexcision.  The medial margin was negative for disease, deep margin was positive for DCIS.  On 1/22/2019 the patient underwent a second reexcision completed by Dr. Cox.  Final pathology demonstrated negative margins.  On 1/25/2019 the patient was seen by Dr. Garcia who discussed continuation of anti-endocrine therapy.  The patient presents today to discuss the potential role of adjuvant radiation therapy.    She is overall doing well.  No issues with range of motion.  No history of lymphedema.  No connective tissue disorder.  No pacemaker.  Prior history of radiation therapy to the left breast outlined above.    Past Medical History:   No past medical history on file.    Past Surgical History:   No past surgical history on file.    Chemotherapy History:  Per HPI    Radiation History:   Left breast cancer, stage T1c N1a M0, status post lumpectomy and axillary lymph node dissection with 1 of 15 removed lymph nodes positive for metastatic disease.  ER/KS positive, HER-2/chapin positive, status post chemotherapy and completed 6040 cGy in 33 fractions, completed on March 11, 2010.      Pregnant: Not Applicable  Implanted Cardiac Devices: No    Medications:  Current Outpatient Medications   Medication     naproxen sodium (ALEVE) 220 MG tablet     No current facility-administered medications for this visit.          Allergies:   No Known Allergies      Family History:  Family History   Problem Relation Age of Onset     Breast Cancer Mother        Review of Systems   A 10-point review of systems  "was performed. Pertinent findings are noted in the HPI.    Physical Exam   ECOG Status: 1    Vitals:  There were no vitals taken for this visit.    Gen: Alert, in NAD  Head: NC/AT  Eyes: PERRL, EOMI, sclera anicteric  Ears: No external auricular lesions  Nose/sinus: No rhinorrhea or epistaxis  Oral cavity/oropharynx: MMM, no visible oral cavity lesions, FOM and BOT are soft to palpation  Neck: Full ROM, supple, no palpable adenopathy  Pulm: No wheezing, stridor or respiratory distress  CV: Extremities are warm and well-perfused, no cyanosis, no pedal edema  Abdominal: Normal bowel sounds, soft, nontender, no masses  Musculoskeletal: Normal bulk and tone, no issues with ROM.   Lymph: No extremity lymphedema.   Skin: Normal color and turgor  Neuro: A/Ox3, CN II-XII intact, normal gait    Imaging/Path/Labs   Imagin18  INDICATION: Grouped microcalcifications on screening examination.  COMPARISON: 2018    MAMMOGRAPHIC FINDINGS: Right full-field digital diagnostic mammogram performed. The breast tissue is heterogeneously dense, which may obscure small masses. Spot magnification images in the craniocaudal and 90-degree mediolateral projection show the grouped calcifications are in a linear configuration and mildly pleomorphic extending from the 10 to 12 o'clock position of the right breast middle depth on the implant displaced views. These are indeterminate for malignancy and stereotactic core needle biopsy is recommended. Images evaluated with the assistance of CAD.    Path:   10/19/18  Received: 10/19/2018   Reported: 10/23/2018   Physician(s): ED YEBOAH                      Final Pathologic Diagnosis   A. Breast, right, partial mastectomy --       1.  Ductal carcinoma in situ (DCIS), cribriform and   micropapillary patterns, nuclear grade 2       2.  DCIS present at \"en face\" lateral margin, \"en face\" medial   margins and at <1 mm from deep margin       3.  No invasive carcinoma seen " "      4.  Prior biopsy site changes/cavity present       5.  Focal atypical lobular hyperplasia (ALH)       6.  Background nonproliferative fibrocystic change       7.  See DCIS cancer staging parameters and comments below   B. Breast, right, new lateral margin, excision --       1.  Benign breast parenchyma with nonproliferative fibrocystic   change       2.  No DCIS or invasive carcinoma seen   DCIS: Breast Cancer Staging Parameters   Specimen type: Right breast partial mastectomy with reexcision of   lateral margin   Tumor location: No grossly visible tumor   Multifocality: Unifocal   Primary Tumor:        Histologic type:  Ductal carcinoma in situ (DCIS)   Size (Extent) of DCIS: at least 3.2 cm (32 mm) (6 blocks of DCIS per   12 blocks examined), see comment       Histologic pattern and grade: Cribriform and micropapillary   patterns, nuclear grade 2       Associated necrosis: Not identified       Associated microcalcifications: Present in DCIS and in   nonneoplastic breast tissues       Angiolymphatic invasion: Not identified            Dermal/lymphatic invasion:  N/A       Margin status:  a) DCIS present at \"en face\" medial margin (A9),   B) DCIS present at 'en face\" lateral margin (A1) in specimen A (Final   (reexcised) lateral margin in specimen B - negative for DCIS)            Distance to nearest margin(s): Deep margin- <1 mm, rest of   the margins 5 mm or more (biopsy cavity at ~3 mm from superficial   margin)       Biopsy cavity: Present       Additional findings: a) focal atypical lobular hyperplasia, b)   nonproliferative fibrocystic change          Regional lymph nodes (pN): No regional lymph nodes submitted for   histopathologic examination        Total number of nodes:  0 lymph nodes including 0 sentinel   node(s)   Receptor Studies:  Immunohistochemistry performed on the needle core   biopsy, results listed below (please refer to case number [id number]   for complete report)       ER: High positive " "in DCIS   Pathologic stage (pTN) (AJCC/UICC Volume 8):  pTis(DCIS) NX    Comments   The medial to lateral dimension of the specimen is 3.2 cm.  Since both   medial and lateral\" en face\" margins are positive for DCIS, the DCIS   is estimated to be at least 3.2 cm.  The above findings are discussed     10/25/18  Final Pathologic Diagnosis   A.  Right breast, new medial margin, re-excision --       - Negative for residual ductal carcinoma in situ.       - Nonproliferative fibrocystic change and chronic inflammation   present.   B.  Right breast, new deep margin, re-excision --       - Ductal carcinoma in-situ, cribriform and micropapillary types   with comedonecrosis, high nuclear grade.       - DCIS involves 4 of 5 tissue blocks.       - DCIS present at the new en face deep margin.       - Negative for invasive malignancy.       - Proliferative fibrocystic change.     1/8/19  Final Pathologic Diagnosis   Breast, right, new posterior margin, excision --       1.  Adipose predominant benign breast parenchyma with focal   fibrosis and foreign body giant cell reaction       2.  No DCIS or invasive carcinoma seen       3.  A portion of skeletal muscle present       4.  Oriented reexcision margin free of malignancy     Assessment    Ms. Urias is a 63 year old female with a history of left breast cancer stage T1cN1 status post lumpectomy axillary lymph node dissection followed by adjuvant systemic therapy and radiation therapy (completed 2010)  now presenting with a newly diagnosed right breast ductal carcinoma in situ. the patient is now status post lumpectomy. Final pathology demonstrated ductal carcinoma in situ, intermediate grade, 3.2cm, ER+. The patient initially had multifocal positive margins and is now s/p 2 resections with final negative margins. She presents today to discuss the potential role of adjuvant radiation therapy.     Plan     Today, we discussed that in general, adjuvant whole breast radiotherapy is " recommended as it has been demonstrated to reduce tumor recurrence by 50%, which has been demonstrated in a  meta-analysis of DCIS patients treated with radiation over lumpectomy alone, despite no survival benefit  detected (EBCTCG, JNCI, 2010).  However, we also discussed that there is clinical evidence suggesting that highly selected patients with small, low to intermediate grade DCIS lesions can be offered observation after lumpectomy rather than radiation. In a prospective, randomized study by the ECOG, patients with low/intermediate grade DCIS, median tumor size 6mm, resected with margins >3mm had ipsilateral breast tumor recurrence rates of 6% at 5 years (Tanna, JCO, 2009). This is approximately a 1% recurrence rate per year. Another study RTOG 9804, also showed recurrence rates of approximately 1% per year without radiation in patients who had low risk DCIS (Cortney et al, JCO, 2015).  Though radiation did significantly reduce recurrence rates (greater than 50% relative reduction), the absolute benefit was small, decreasing from 6.4% to 0.9% at 7 years. Mrs. Urias would technically not have been a candidate for the mentioned trials given her large tumor size and margin status. As such, I would recommend adjuvant whole breast radiation therapy. I discussed that I would recommend whole breast radiation therapy to 4005 cGy in 15 fractions followed by sequential lumpectomy bed boost to 1000 cGy in 5 fractions. Although her final margin was negative, I would recommend boost given her history of multiple re-excisions due to multifocal positive margins.      Today we also discussed the logistics of treatment planning and delivery in detail with the patient. We also discussed side effects, including short term risks of fatigue and skin reaction, and long term risks of pneumonitis, lung fibrosis, soft tissue fibrosis, skin changes, breast contour changes, rib fractures, cardiac damage, secondary cancers,  lymphedema, and thyroid dysfunction. We described the use of 3D-conformal radiotherapy to minimize dose to the normal tissues, while adequately covering the target tissues, and the ability of this technique to decrease potential for toxicity.    With regards to sparing of the heart, we discussed the use of deep inspiration breath hold for treatment planning and delivery. This method has been shown to significantly reduce dose to the lung and heart as compared to treatment with free breathing (Jordi et al, AJCO, 2012). We discussed that this will involve 3D-conformal treatment planning, with contouring of the heart, and that limiting heart dose will be a high priority for treatment planning, as will dose to the contralateral breast and lung.     The patient would like to think and discuss consideration of adjuvant radiation with her  before proceeding with treatment. She will let us know how she wishes to proceed, and  CT simulation will be scheduled accordingly. Of note, the patient has a scheduled trip from March 13-27th and ideally would prefer treatment to be completed prior to her trip. As such, we also discussed possibility of a 15-16 fraction regimen without boost as well.     Stepan Marie MD  Department of Radiation Oncology  [facility]

## 2019-02-07 NOTE — NURSING NOTE
REASON FOR APPOINTMENT   Newly diagnosed right breast DCIS, s/p lumpectomy and re-excision x 2.  No chemotherapy indicated.  Planning tamoxifen after RT.  See [facility]/care everywhere for more dates/details.     PERSONAL HISTORY OF CANCER   Previous Cancer ? Left breast cancer - T1cN1a, 2009, s/p lumpectomy + chemotherapy + tamoxifen/AI   Prior Radiation ? [place]:  2010: 33fx/6040 cGy  Prior Chemotherapy ? See above     REFERRALS NEEDED  Not at this time    VITALS  /77 (BP Location: Right arm, Cuff Size: Adult Regular)   Pulse 90   Resp 16   Wt 53.5 kg (118 lb)   SpO2 97%   BMI 19.05 kg/m      PACEMAKER/IMPLANTED CARDIAC DEVICE : No    PAIN  Generalized pain from fibromyalgia    PSYCHOSOCIAL  Marital Status:   Patient lives in [place], [place] with spouse.  Number of children: 3  Working status: retired , now cleans homes full time  Do you feel safe in your home? Yes    REVIEW OF SYSTEMS  Skin: healing lumpectomy site  Eyes: negative  Ears/Nose/Throat: negative  Respiratory: No shortness of breath, dyspnea on exertion, cough, or hemoptysis  Cardiovascular: negative  Gastrointestinal: negative  Genitourinary: negative  Musculoskeletal: fibromyalgia  Neurologic: negative  Psychiatric: negative  Hematologic/Lymphatic/Immunologic: negative  Endocrine: negative    WOMEN ONLY  Any chance you may be pregnant: No  Age at first period: 15  Date of last period: 2009  Number of pregnancies: 3    Radiation Oncology Patient Teaching    Person involved with teaching: Patient  Patient asked Questions: Yes  Patient was cooperative: Yes  Patient was receptive (willing to accept information given): Yes    Education Assessment  Comprehension ability: High  Knowledge level: High  Factors affecting teaching: None    Education Materials Given  Caring for Your Skin During Radiation ...  Eating Hints  Diet and Nutrition Information    Educational Topics Discussed  Side effects, Pain management, Activity, Nutrition and  Community resources    Response To Teaching  Verbalizes understanding    GYN Only  Vaginal Dilator-given and educated: N/A    Do you have an advanced directive or living will? no  Are you DNR/DNI? no

## 2021-10-21 ENCOUNTER — PATIENT OUTREACH (OUTPATIENT)
Dept: ONCOLOGY | Facility: CLINIC | Age: 66
End: 2021-10-21

## 2021-10-21 ENCOUNTER — TRANSCRIBE ORDERS (OUTPATIENT)
Dept: OTHER | Age: 66
End: 2021-10-21

## 2021-10-21 DIAGNOSIS — C50.919 BREAST CANCER (H): Primary | ICD-10-CM

## 2021-10-21 NOTE — PROGRESS NOTES
"New Patient Oncology Nurse Navigator Note     Referring provider: Self     Referring Clinic/Organization: Transferring care from Dr. Bland at [place]     Referred to (specialty): Medical oncology     Date Referral Received: 10/21/21     Evaluation for :Breast cancer     Clinical History (per Nurse review of records provided):    2009 She has a history of a stage 2 breast cancer on the left. She had a lumpectomy and then received Adriamycin/Cytoxan followed by Taxol and Herceptin and finished a year of Herceptin adjuvant therapy. Dr. Ladi Garcia was her medical oncology at [facility].  She also had radiation therapy after partial left mastectomy and radiation was completed in March 2010.  Dr. Lorna Cabrera was her radiation oncologist.  Dr. Ladi Garcia was her medical oncologist and prescribed tamoxifen which was then changed to Aromasin on 1/18/2012.      2018 9/27/2018 - Breast, right 10 o'clock zone two, with and without   microcalcifications, core biopsies;       1.  Ductal carcinoma in situ, cribriform type, intermediate grade       2.  No invasive carcinoma seen       3.  Rare microcalcifications present associated with benign ducts       4.  Estrogen receptor is ordered and results will be submitted in     10/19/2018 - A. Breast, right, partial mastectomy --       1.  Ductal carcinoma in situ (DCIS), cribriform and micropapillary patterns, nuclear grade 2       2.  DCIS present at \"en face\" lateral margin, \"en face\" medial margins and at <1 mm from deep margin       3.  No invasive carcinoma seen       4.  Prior biopsy site changes/cavity present       5.  Focal atypical lobular hyperplasia (ALH)       6.  Background nonproliferative fibrocystic change       7.  See DCIS cancer staging parameters and comments below   B. Breast, right, new lateral margin, excision --       1.  Benign breast parenchyma with nonproliferative fibrocystic change       2.  No DCIS or invasive carcinoma seen    10/25/2018 - A.  " Right breast, new medial margin, re-excision --       - Negative for residual ductal carcinoma in situ.       - Nonproliferative fibrocystic change and chronic inflammation present.   B.  Right breast, new deep margin, re-excision --       - Ductal carcinoma in-situ, cribriform and micropapillary types with comedonecrosis, high nuclear grade.       - DCIS involves 4 of 5 tissue blocks.       - DCIS present at the new en face deep margin.       - Negative for invasive malignancy.       - Proliferative fibrocystic change.    1/8/2019   Breast, right, new posterior margin, excision --       1.  Adipose predominant benign breast parenchyma with focal   fibrosis and foreign body giant cell reaction       2.  No DCIS or invasive carcinoma seen       3.  A portion of skeletal muscle present       4.  Oriented reexcision margin free of malignancy    6/14/2019  A.  Breast, left, capsule and implant, excision:    Disrupted breast implant with surrounding fibrous capsule showing focal calcifications    Negative for malignancy her     2021 8/27/21 - bilateral screening mammogram -- since the prior exam there has been lumpectomy of the right breast, exchange of bilateral implants, and left breast capsulectomy. Intact saline implants are present.   There is a focal asymmetry with associated microcalcifications in the right breast at the 11 o'clock position at anterior depth.    9/8/21 - right breast diagnostic mammogram and US   MAMMOGRAPHIC FINDINGS: Subpectoral intact saline implant present. Along the superior and lateral aspect of the lumpectomy scar there is a new focal asymmetry with associated amorphous calcifications.     ULTRASOUND FINDINGS: Targeted ultrasound of the right breast in the area of mammographic interest demonstrates a 3.6 x 0.9 x 2.6 cm mass of dilated ducts at 10:00 4 cm from the nipple with associated microcalcifications. Other scattered smaller areas of dilated ducts are present at 12:00 6 cm from the  nipple.    9/8/21 - A.  Breast, right, 10:00; 4 cm FN, needle core biopsy:    1 mm focus of invasive ductal carcinoma, [place] grade 2    Background extensive ductal carcinoma in Situ (DCIS), micropapillary pattern with necrosis, high nuclear grade    Microcalcifications present associated with DCIS   ER Positive 100%, [place] positive 20%, HER2 POSITIVE by FISH    9/17/21 - Bilatral breast MRI  1.  Segmental mass and nonmass enhancement in the right breast at the site of patient's biopsy-proven carcinoma, measuring 5.8 x 3.4 x 2.3 cm, centered at the 10:00 position.   2.  Suspicious nonmass clumped enhancement is present in the superior central right breast at the 12:00 position, anterior depth, measuring 1.1 x 1.1 x 0.6 cm.   3.  Suspicious nonmass clumped segmental enhancement is present in the medial right breast at the 2:00 position, middle to anterior depth, measuring 4.6 x 2.3 x 0.6 cm.   4.  Multiple visible mildly thickened right axillary lymph nodes, which are indeterminant. The differential includes possible reactive lymph nodes versus tish metastases. Further evaluation with targeted ultrasound is recommended with thought towards biopsy of any suspicious lymph node.   5.  Indeterminate curvilinear enhancement along the lateral aspect of the left breast implant which is new since previous exam, thought to be probably benign and favored to represent postsurgical change/scarring/fat necrosis.    9/22/21 -- A.  Lymph node, right axilla, needle core biopsy:    Lymph node tissue, negative for metastatic carcinoma    2021 - She had suspicious lymphadenopathy on MRI. Biopsy of a lymph node was negative. This lymph node was marked with a radioactive seed. On her preop labs yesterday, there was a concern for lymphoma.     2021 - Dash Penny MD - plastic surgeon, Dolly Cox General surgeon  10/5/21 bilateral mastectomies and right axillary sentinel lymph node biopsy  PROCEDURES PERFORMED:   1. Right breast  complete periprosthetic capsulectomy.   2. Bilateral breast reconstruction with immediate insertion of tissue expanders.  3. Use of AlloDerm for soft tissue support in the bilateral breast reconstruction.   4. SPY angiography of the bilateral breasts.  A.  Breast, right, mastectomy:    Invasive ductal carcinoma, [place] grade 2 of 3, multifocal, see comment    Ductal carcinoma in situ, high nuclear grade, micropapillary type with necrosis    Resection margins: the invasive carcinoma is 0.5 cm from closest anterior superior margin, 0.9 cm from posterior margin and more than 1 cm from anterior inferior margin    DCIS is present at anterior superior margin (9:00,12:00 and 2:00 positions), <1 mm from posterior and anterior inferior margins of this specimen, see note     Nipple lactiferous ducts involved by DCIS    Prior biopsy site changes/cavity present    See synoptic report below   Note:  Entire additional anterior superior margin is excised (part D and E)  B.  Lymph node, sentinel, excision:    Two lymph nodes, involved by chronic lymphocytic leukemia/small lymphocytic lymphoma (CLL/SLL)     Negative for metastatic carcinoma (0/2)  C.  Breast, left, prophylactic mastectomy:    Breast tissue with fibrous capsule, calcifications and giant cell reaction to foreign material    Negative for atypia or malignancy  D.  Breast, right, new anterior margin at 9:00, excision:    Skin and subcutaneous tissue with focal breast parenchyma    Negative for DCIS or invasive malignancy  E.  Breast, right, new additional anterior superior margin, excision:    Focal ductal carcinoma in situ  F.  Breast, right, Right breast capsule, excision:    Fibrous capsule, fibroadipose tissue and focal skeletal muscle     Negative for DCIS or invasive malignancy    10/19/21 - Met with Dr. Sarai Bland and genetic counseling was ordered.  Dr. Bland recommended chemotherapy (TDM1 IV every 3 weeks for 17 cycles) to start in early November.       Records Location (Care Everywhere, Media, etc.): Care Everywhere, Media      Records Needed: 2009 medical and radiation oncology consults and notes and all relevant imaging and pathology ([place])    Telephoned and spoke with Jackie to review location options for medical oncologist [place].

## 2021-10-22 ENCOUNTER — PRE VISIT (OUTPATIENT)
Dept: OTHER | Age: 66
End: 2021-10-22

## 2021-10-22 NOTE — TELEPHONE ENCOUNTER
Action 10/22/2021   Action Taken Imaging from [facility] requested   All breast imaging from 08/2021 to present    Ck

## 2021-10-22 NOTE — TELEPHONE ENCOUNTER
Action 10/22/2021   Action Taken Breast US from [facility] resolved and imaging department called to resolve Derek  ck

## 2021-11-04 ENCOUNTER — ONCOLOGY VISIT (OUTPATIENT)
Dept: ONCOLOGY | Facility: HOSPITAL | Age: 66
End: 2021-11-04
Attending: INTERNAL MEDICINE
Payer: COMMERCIAL

## 2021-11-04 VITALS
DIASTOLIC BLOOD PRESSURE: 96 MMHG | SYSTOLIC BLOOD PRESSURE: 153 MMHG | HEART RATE: 104 BPM | RESPIRATION RATE: 16 BRPM | BODY MASS INDEX: 19.93 KG/M2 | HEIGHT: 66 IN | WEIGHT: 124 LBS | OXYGEN SATURATION: 97 %

## 2021-11-04 DIAGNOSIS — Z17.0 MALIGNANT NEOPLASM OF CENTRAL PORTION OF RIGHT BREAST IN FEMALE, ESTROGEN RECEPTOR POSITIVE (H): Primary | ICD-10-CM

## 2021-11-04 DIAGNOSIS — C91.10 CLL (CHRONIC LYMPHOCYTIC LEUKEMIA) (H): ICD-10-CM

## 2021-11-04 DIAGNOSIS — C50.111 MALIGNANT NEOPLASM OF CENTRAL PORTION OF RIGHT BREAST IN FEMALE, ESTROGEN RECEPTOR POSITIVE (H): Primary | ICD-10-CM

## 2021-11-04 DIAGNOSIS — Z11.59 ENCOUNTER FOR SCREENING FOR OTHER VIRAL DISEASES: ICD-10-CM

## 2021-11-04 PROCEDURE — G0463 HOSPITAL OUTPT CLINIC VISIT: HCPCS

## 2021-11-04 PROCEDURE — 99205 OFFICE O/P NEW HI 60 MIN: CPT | Performed by: INTERNAL MEDICINE

## 2021-11-04 RX ORDER — METHYLPREDNISOLONE SODIUM SUCCINATE 125 MG/2ML
125 INJECTION, POWDER, LYOPHILIZED, FOR SOLUTION INTRAMUSCULAR; INTRAVENOUS
Status: CANCELLED
Start: 2021-11-22

## 2021-11-04 RX ORDER — HEPARIN SODIUM,PORCINE 10 UNIT/ML
5 VIAL (ML) INTRAVENOUS
Status: CANCELLED | OUTPATIENT
Start: 2021-12-27

## 2021-11-04 RX ORDER — NALOXONE HYDROCHLORIDE 0.4 MG/ML
0.2 INJECTION, SOLUTION INTRAMUSCULAR; INTRAVENOUS; SUBCUTANEOUS
Status: CANCELLED | OUTPATIENT
Start: 2021-11-29

## 2021-11-04 RX ORDER — EPINEPHRINE 1 MG/ML
0.3 INJECTION, SOLUTION INTRAMUSCULAR; SUBCUTANEOUS EVERY 5 MIN PRN
Status: CANCELLED | OUTPATIENT
Start: 2021-12-27

## 2021-11-04 RX ORDER — ACETAMINOPHEN 325 MG/1
650 TABLET ORAL
Status: CANCELLED | OUTPATIENT
Start: 2021-11-22

## 2021-11-04 RX ORDER — HEPARIN SODIUM (PORCINE) LOCK FLUSH IV SOLN 100 UNIT/ML 100 UNIT/ML
5 SOLUTION INTRAVENOUS
Status: CANCELLED | OUTPATIENT
Start: 2021-11-29

## 2021-11-04 RX ORDER — MEPERIDINE HYDROCHLORIDE 25 MG/ML
25 INJECTION INTRAMUSCULAR; INTRAVENOUS; SUBCUTANEOUS EVERY 30 MIN PRN
Status: CANCELLED | OUTPATIENT
Start: 2021-11-22

## 2021-11-04 RX ORDER — NALOXONE HYDROCHLORIDE 0.4 MG/ML
0.2 INJECTION, SOLUTION INTRAMUSCULAR; INTRAVENOUS; SUBCUTANEOUS
Status: CANCELLED | OUTPATIENT
Start: 2021-12-27

## 2021-11-04 RX ORDER — ALBUTEROL SULFATE 0.83 MG/ML
2.5 SOLUTION RESPIRATORY (INHALATION)
Status: CANCELLED | OUTPATIENT
Start: 2021-11-15

## 2021-11-04 RX ORDER — DIPHENHYDRAMINE HCL 50 MG
50 CAPSULE ORAL ONCE
Status: CANCELLED
Start: 2021-11-15

## 2021-11-04 RX ORDER — HEPARIN SODIUM (PORCINE) LOCK FLUSH IV SOLN 100 UNIT/ML 100 UNIT/ML
5 SOLUTION INTRAVENOUS
Status: CANCELLED | OUTPATIENT
Start: 2021-12-27

## 2021-11-04 RX ORDER — DIPHENHYDRAMINE HCL 50 MG
50 CAPSULE ORAL
Status: CANCELLED
Start: 2021-12-27

## 2021-11-04 RX ORDER — NALOXONE HYDROCHLORIDE 0.4 MG/ML
0.2 INJECTION, SOLUTION INTRAMUSCULAR; INTRAVENOUS; SUBCUTANEOUS
Status: CANCELLED | OUTPATIENT
Start: 2021-12-06

## 2021-11-04 RX ORDER — HEPARIN SODIUM,PORCINE 10 UNIT/ML
5 VIAL (ML) INTRAVENOUS
Status: CANCELLED | OUTPATIENT
Start: 2021-12-06

## 2021-11-04 RX ORDER — NALOXONE HYDROCHLORIDE 0.4 MG/ML
0.2 INJECTION, SOLUTION INTRAMUSCULAR; INTRAVENOUS; SUBCUTANEOUS
Status: CANCELLED | OUTPATIENT
Start: 2021-11-15

## 2021-11-04 RX ORDER — METHYLPREDNISOLONE SODIUM SUCCINATE 125 MG/2ML
125 INJECTION, POWDER, LYOPHILIZED, FOR SOLUTION INTRAMUSCULAR; INTRAVENOUS
Status: CANCELLED
Start: 2021-11-15

## 2021-11-04 RX ORDER — ALBUTEROL SULFATE 90 UG/1
1-2 AEROSOL, METERED RESPIRATORY (INHALATION)
Status: CANCELLED
Start: 2021-11-29

## 2021-11-04 RX ORDER — DIPHENHYDRAMINE HCL 50 MG
50 CAPSULE ORAL
Status: CANCELLED
Start: 2021-11-29

## 2021-11-04 RX ORDER — ALBUTEROL SULFATE 0.83 MG/ML
2.5 SOLUTION RESPIRATORY (INHALATION)
Status: CANCELLED | OUTPATIENT
Start: 2021-12-27

## 2021-11-04 RX ORDER — ACETAMINOPHEN 325 MG/1
650 TABLET ORAL ONCE
Status: CANCELLED | OUTPATIENT
Start: 2021-11-15

## 2021-11-04 RX ORDER — LIFITEGRAST 50 MG/ML
1 SOLUTION/ DROPS OPHTHALMIC EVERY 12 HOURS
COMMUNITY
Start: 2021-10-29

## 2021-11-04 RX ORDER — DIPHENHYDRAMINE HCL 50 MG
50 CAPSULE ORAL ONCE
Status: CANCELLED
Start: 2021-11-22

## 2021-11-04 RX ORDER — METHYLPREDNISOLONE SODIUM SUCCINATE 125 MG/2ML
125 INJECTION, POWDER, LYOPHILIZED, FOR SOLUTION INTRAMUSCULAR; INTRAVENOUS
Status: CANCELLED
Start: 2021-11-29

## 2021-11-04 RX ORDER — DIPHENHYDRAMINE HYDROCHLORIDE 50 MG/ML
50 INJECTION INTRAMUSCULAR; INTRAVENOUS
Status: CANCELLED
Start: 2021-11-22

## 2021-11-04 RX ORDER — ALBUTEROL SULFATE 0.83 MG/ML
2.5 SOLUTION RESPIRATORY (INHALATION)
Status: CANCELLED | OUTPATIENT
Start: 2021-11-29

## 2021-11-04 RX ORDER — HEPARIN SODIUM,PORCINE 10 UNIT/ML
5 VIAL (ML) INTRAVENOUS
Status: CANCELLED | OUTPATIENT
Start: 2021-11-15

## 2021-11-04 RX ORDER — ALBUTEROL SULFATE 90 UG/1
1-2 AEROSOL, METERED RESPIRATORY (INHALATION)
Status: CANCELLED
Start: 2021-12-06

## 2021-11-04 RX ORDER — DIPHENHYDRAMINE HYDROCHLORIDE 50 MG/ML
50 INJECTION INTRAMUSCULAR; INTRAVENOUS
Status: CANCELLED
Start: 2021-12-27

## 2021-11-04 RX ORDER — MEPERIDINE HYDROCHLORIDE 25 MG/ML
25 INJECTION INTRAMUSCULAR; INTRAVENOUS; SUBCUTANEOUS EVERY 30 MIN PRN
Status: CANCELLED | OUTPATIENT
Start: 2021-11-29

## 2021-11-04 RX ORDER — DIPHENHYDRAMINE HYDROCHLORIDE 50 MG/ML
50 INJECTION INTRAMUSCULAR; INTRAVENOUS
Status: CANCELLED
Start: 2021-11-15

## 2021-11-04 RX ORDER — MEPERIDINE HYDROCHLORIDE 25 MG/ML
25 INJECTION INTRAMUSCULAR; INTRAVENOUS; SUBCUTANEOUS EVERY 30 MIN PRN
Status: CANCELLED | OUTPATIENT
Start: 2021-12-06

## 2021-11-04 RX ORDER — METHYLPREDNISOLONE SODIUM SUCCINATE 125 MG/2ML
125 INJECTION, POWDER, LYOPHILIZED, FOR SOLUTION INTRAMUSCULAR; INTRAVENOUS
Status: CANCELLED
Start: 2021-12-27

## 2021-11-04 RX ORDER — DIPHENHYDRAMINE HYDROCHLORIDE 50 MG/ML
50 INJECTION INTRAMUSCULAR; INTRAVENOUS
Status: CANCELLED
Start: 2021-12-06

## 2021-11-04 RX ORDER — DIPHENHYDRAMINE HCL 50 MG
50 CAPSULE ORAL
Status: CANCELLED
Start: 2021-12-06

## 2021-11-04 RX ORDER — LORAZEPAM 2 MG/ML
0.5 INJECTION INTRAMUSCULAR EVERY 4 HOURS PRN
Status: CANCELLED | OUTPATIENT
Start: 2021-12-06

## 2021-11-04 RX ORDER — HEPARIN SODIUM (PORCINE) LOCK FLUSH IV SOLN 100 UNIT/ML 100 UNIT/ML
5 SOLUTION INTRAVENOUS
Status: CANCELLED | OUTPATIENT
Start: 2021-11-15

## 2021-11-04 RX ORDER — LORAZEPAM 2 MG/ML
0.5 INJECTION INTRAMUSCULAR EVERY 4 HOURS PRN
Status: CANCELLED | OUTPATIENT
Start: 2021-11-29

## 2021-11-04 RX ORDER — DIPHENHYDRAMINE HYDROCHLORIDE 50 MG/ML
50 INJECTION INTRAMUSCULAR; INTRAVENOUS
Status: CANCELLED
Start: 2021-11-29

## 2021-11-04 RX ORDER — EPINEPHRINE 1 MG/ML
0.3 INJECTION, SOLUTION INTRAMUSCULAR; SUBCUTANEOUS EVERY 5 MIN PRN
Status: CANCELLED | OUTPATIENT
Start: 2021-12-06

## 2021-11-04 RX ORDER — HEPARIN SODIUM,PORCINE 10 UNIT/ML
5 VIAL (ML) INTRAVENOUS
Status: CANCELLED | OUTPATIENT
Start: 2021-11-29

## 2021-11-04 RX ORDER — LORAZEPAM 2 MG/ML
0.5 INJECTION INTRAMUSCULAR EVERY 4 HOURS PRN
Status: CANCELLED | OUTPATIENT
Start: 2021-12-27

## 2021-11-04 RX ORDER — LORAZEPAM 2 MG/ML
0.5 INJECTION INTRAMUSCULAR EVERY 4 HOURS PRN
Status: CANCELLED | OUTPATIENT
Start: 2021-11-15

## 2021-11-04 RX ORDER — HEPARIN SODIUM,PORCINE 10 UNIT/ML
5 VIAL (ML) INTRAVENOUS
Status: CANCELLED | OUTPATIENT
Start: 2021-11-22

## 2021-11-04 RX ORDER — METHYLPREDNISOLONE SODIUM SUCCINATE 125 MG/2ML
125 INJECTION, POWDER, LYOPHILIZED, FOR SOLUTION INTRAMUSCULAR; INTRAVENOUS
Status: CANCELLED
Start: 2021-12-06

## 2021-11-04 RX ORDER — EPINEPHRINE 1 MG/ML
0.3 INJECTION, SOLUTION INTRAMUSCULAR; SUBCUTANEOUS EVERY 5 MIN PRN
Status: CANCELLED | OUTPATIENT
Start: 2021-11-15

## 2021-11-04 RX ORDER — LORAZEPAM 2 MG/ML
0.5 INJECTION INTRAMUSCULAR EVERY 4 HOURS PRN
Status: CANCELLED | OUTPATIENT
Start: 2021-11-22

## 2021-11-04 RX ORDER — ACETAMINOPHEN 325 MG/1
650 TABLET ORAL
Status: CANCELLED | OUTPATIENT
Start: 2021-12-06

## 2021-11-04 RX ORDER — ACETAMINOPHEN 325 MG/1
650 TABLET ORAL
Status: CANCELLED | OUTPATIENT
Start: 2021-12-27

## 2021-11-04 RX ORDER — ALBUTEROL SULFATE 90 UG/1
1-2 AEROSOL, METERED RESPIRATORY (INHALATION)
Status: CANCELLED
Start: 2021-12-27

## 2021-11-04 RX ORDER — EPINEPHRINE 1 MG/ML
0.3 INJECTION, SOLUTION INTRAMUSCULAR; SUBCUTANEOUS EVERY 5 MIN PRN
Status: CANCELLED | OUTPATIENT
Start: 2021-11-29

## 2021-11-04 RX ORDER — ALBUTEROL SULFATE 90 UG/1
1-2 AEROSOL, METERED RESPIRATORY (INHALATION)
Status: CANCELLED
Start: 2021-11-15

## 2021-11-04 RX ORDER — ACETAMINOPHEN 325 MG/1
650 TABLET ORAL
Status: CANCELLED | OUTPATIENT
Start: 2021-11-29

## 2021-11-04 RX ORDER — HEPARIN SODIUM (PORCINE) LOCK FLUSH IV SOLN 100 UNIT/ML 100 UNIT/ML
5 SOLUTION INTRAVENOUS
Status: CANCELLED | OUTPATIENT
Start: 2021-11-22

## 2021-11-04 RX ORDER — ALBUTEROL SULFATE 90 UG/1
1-2 AEROSOL, METERED RESPIRATORY (INHALATION)
Status: CANCELLED
Start: 2021-11-22

## 2021-11-04 RX ORDER — EPINEPHRINE 1 MG/ML
0.3 INJECTION, SOLUTION INTRAMUSCULAR; SUBCUTANEOUS EVERY 5 MIN PRN
Status: CANCELLED | OUTPATIENT
Start: 2021-11-22

## 2021-11-04 RX ORDER — ALBUTEROL SULFATE 0.83 MG/ML
2.5 SOLUTION RESPIRATORY (INHALATION)
Status: CANCELLED | OUTPATIENT
Start: 2021-12-06

## 2021-11-04 RX ORDER — MEPERIDINE HYDROCHLORIDE 25 MG/ML
25 INJECTION INTRAMUSCULAR; INTRAVENOUS; SUBCUTANEOUS EVERY 30 MIN PRN
Status: CANCELLED | OUTPATIENT
Start: 2021-11-15

## 2021-11-04 RX ORDER — HEPARIN SODIUM (PORCINE) LOCK FLUSH IV SOLN 100 UNIT/ML 100 UNIT/ML
5 SOLUTION INTRAVENOUS
Status: CANCELLED | OUTPATIENT
Start: 2021-12-06

## 2021-11-04 RX ORDER — MEPERIDINE HYDROCHLORIDE 25 MG/ML
25 INJECTION INTRAMUSCULAR; INTRAVENOUS; SUBCUTANEOUS EVERY 30 MIN PRN
Status: CANCELLED | OUTPATIENT
Start: 2021-12-27

## 2021-11-04 RX ORDER — NALOXONE HYDROCHLORIDE 0.4 MG/ML
0.2 INJECTION, SOLUTION INTRAMUSCULAR; INTRAVENOUS; SUBCUTANEOUS
Status: CANCELLED | OUTPATIENT
Start: 2021-11-22

## 2021-11-04 RX ORDER — B-COMPLEX WITH VITAMIN C
15 TABLET ORAL
COMMUNITY

## 2021-11-04 RX ORDER — ALBUTEROL SULFATE 0.83 MG/ML
2.5 SOLUTION RESPIRATORY (INHALATION)
Status: CANCELLED | OUTPATIENT
Start: 2021-11-22

## 2021-11-04 ASSESSMENT — MIFFLIN-ST. JEOR: SCORE: 1134.21

## 2021-11-04 ASSESSMENT — PAIN SCALES - GENERAL: PAINLEVEL: MODERATE PAIN (4)

## 2021-11-04 NOTE — LETTER
"    11/4/2021         RE: Jackie Urias  [address]  [place] [address]        Dear Colleague,    Thank you for referring your patient, Jackie Urias, to the [facility] [place]. Please see a copy of my visit note below.    Oncology Rooming Note    November 4, 2021 12:59 PM   Jackie Urias is a 65 year old female who presents for:    Chief Complaint   Patient presents with     Oncology Clinic Visit     new consult     Breast Cancer     Initial Vitals: BP (!) 153/96 (BP Location: Right arm, Cuff Size: Adult Regular)   Pulse 104   Resp 16   Ht 1.676 m (5' 6\")   Wt 56.2 kg (124 lb)   SpO2 97%   BMI 20.01 kg/m   Estimated body mass index is 20.01 kg/m  as calculated from the following:    Height as of this encounter: 1.676 m (5' 6\").    Weight as of this encounter: 56.2 kg (124 lb). Body surface area is 1.62 meters squared.  Moderate Pain (4) Comment: Data Unavailable   No LMP recorded.  Allergies reviewed: Yes  Medications reviewed: Yes    Medications: Medication refills not needed today.  Pharmacy name entered into License Acquisitions: [id number] - [place], [place] - [address]    Clinical concerns:  New consult    Corin Chávez              [facility] Consult Note    Patient: Jackie Urias  MRN: [id number]  Date of Service: Nov 4, 2021           Reason for consultation      Problem List Items Addressed This Visit        Other    Malignant neoplasm of central portion of right breast in female, estrogen receptor positive (H)    Relevant Orders    Infusion Appointment Request    Infusion Appointment Request    Infusion Appointment Request    Infusion Appointment Request    Infusion Appointment Request    IR Chest Port Placement > 5 Yrs of Age (Completed)    Asymptomatic COVID-19 Virus (Coronavirus) by PCR    Echocardiogram Limited      Other Visit Diagnoses     Breast cancer (H)    -  Primary    Relevant Orders    Infusion Appointment Request "    Infusion Appointment Request    Infusion Appointment Request    Infusion Appointment Request    Infusion Appointment Request    IR Chest Port Placement > 5 Yrs of Age (Completed)    Asymptomatic COVID-19 Virus (Coronavirus) by PCR    Echocardiogram Limited            Assessment      1.  Very pleasant 65-year-old woman with invasive ductal carcinoma right breast ER positive KS positive HER-2/chapin positive by FISH.  2.  Status post bilateral mastectomy with reconstruction.  She has implants expanders in place.  3.  Prior history of similar breast cancer on the left side 13 years ago in 2009.  At that time she did have 1 lymph node positive.  Got treatment with dose dense AC followed by weekly paclitaxel and Herceptin.  Herceptin was for over a year.  She did get tamoxifen as a hormonal therapy.  She also got adjuvant radiation therapy to the breast.  4.  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  Required 3 surgeries positive margin.  I believe no hormone therapy was given.  5.  Genetic testing in progress.    Plan      1.  Due to the size of the lesion I think I would recommend treatment with weekly paclitaxel and Herceptin.  We did talk about using Kadcyla in her situation.  There is a clinical trial data showing Kadcyla may have perhaps marginally better outcome.  The side effect profile is not different from weekly paclitaxel Herceptin.  2.  Get cardiac evaluation prior to getting started on Herceptin.  3.  Discussed with the patient that she will have hair loss due to Taxol.  4.  After finishing chemotherapy she will be started on hormonal therapy with anastrozole.  5.  Discussed with the patient the side effect that she can experience from this treatment.  Neuropathy would be particularly something that she will be prone to do because she has had prior exposure to taxane.  6.  Continue good and proper exercise.  7.  Continue follow-up with the plastic surgeon to finish her reconstruction  process.    Clinical/pathological stage      Stage I, T1c N0 M0.  ER positive AZ positive HER-2/chapin positive by FISH.  Right breast October 2021      History of present illness        Ms. Jackie Urias is a 65 year old postmenopausal woman who has been diagnosed with right-sided breast cancer located at 11 o'clock position presenting as focal asymmetry on the mammogram in August 2021.  This was on a routine mammogram screening timeframe.  She did not have any palpable lesion.  Diagnostic imaging revealed that she had a 3.6 cm mass at the position along with some other calcification.  She underwent biopsy which confirmed invasive ductal carcinoma ER positive AZ positive HER-2 new positive by FISH.  She then also underwent biopsy of the right axillary lymph node which was negative for any metastatic carcinoma.    She underwent surgery in 5 October 2021 in the form of bilateral mastectomies with right sentinel lymph node biopsy and reconstruction bilaterally with tissue expander placement.  Pathology revealed invasive ductal carcinoma grade 211 mm in size extensive DCIS no lymphovascular invasion.  Final stage I, T1c N0 M0.    Interestingly her lymph nodes did show some evidence of CLL.    She is a prior history of left-sided breast cancer diagnosed in 24 August 2019 when she was 52 years old.  This was also ER positive AZ positive and HER-2/chapin 3+.  She had surgery in 3 September 2009 in the form of lumpectomy and sentinel lymph node biopsy.  1.2 cm tumor.  1 lymph node positive.  14 additional lymph nodes were negative.    Following that she was treated with dose dense Adriamycin and cyclophosphamide while followed by weekly paclitaxel and Herceptin.  She also got radiation therapy and tamoxifen for 5 years.    She also was found to have a DCIS in the right breast intermediate grade ER positive in September 2018.  She underwent right breast partial mastectomy on 19 October 2018.  This was a 3.2 cm DCIS with  positive margin.  No invasive carcinoma was noted.  She had reexcision on 25 October 2018 which did show small amount of residual DCIS and the margins were still not clear.    She underwent reexcision on 8 January 2019 by Dr. Cox which was negative for DCIS or any invasive carcinoma.    She has undergone genetic testing and it is still pending.    She has been seen by Dr. Bland from [facility].  She is here for second opinion.      Detailed review of systems      No fever or night sweats.  No loss of weight.  No lumps or bumps anywhere.  No unusual headaches or eyesight issues.  No dizziness.  No bleeding from the nose.  No sores in the mouth. No problems with swallowing.  No chest pain. No shortness of breath. No cough.  No abdominal pain. No nausea or vomiting.  No diarrhea or constipation.  No blood in stool or black colored stools.  No problems passing urine.  No numbness or tingling in hands or feet.  No skin rashes.  A 14 point review of systems is otherwise negative.      Past medical/surgical/social/family history        Past Medical History:   Diagnosis Date     Cancer (H)      Fibromyalgia      GERD (gastroesophageal reflux disease)      Hernia, hiatal      Past Surgical History:   Procedure Laterality Date     BREAST SURGERY  2009    lumpectomy left breast     BREAST SURGERY  2018    lumpectomy right breast ( + 2 excisions)     CARPAL TUNNEL RELEASE RT/LT       HERNIA REPAIR      1962     IR CHEST PORT PLACEMENT > 5 YRS OF AGE  11/9/2021     Family History   Problem Relation Age of Onset     Breast Cancer Mother      Social History     Socioeconomic History     Marital status:      Spouse name: None     Number of children: None     Years of education: None     Highest education level: None   Occupational History     None   Tobacco Use     Smoking status: Former Smoker     Packs/day: 1.00     Years: 30.00     Pack years: 30.00     Types: Cigarettes     Quit date: 2/7/2008     Years since  "quittin.7     Smokeless tobacco: Never Used   Substance and Sexual Activity     Alcohol use: Yes     Comment: socially/once monthly     Drug use: No     Sexual activity: None   Other Topics Concern     Parent/sibling w/ CABG, MI or angioplasty before 65F 55M? Not Asked   Social History Narrative     None     Social Determinants of Health     Financial Resource Strain: Not on file   Food Insecurity: Not on file   Transportation Needs: Not on file   Physical Activity: Not on file   Stress: Not on file   Social Connections: Not on file   Intimate Partner Violence: Not on file   Housing Stability: Not on file         Allergies        Allergies   Allergen Reactions     Hydrocortisone Rash     Hydrocortisone-ace rectal cream caused redness and blisters at application site.        Physical exam        BP (!) 153/96 (BP Location: Right arm, Cuff Size: Adult Regular)   Pulse 104   Resp 16   Ht 1.676 m (5' 6\")   Wt 56.2 kg (124 lb)   SpO2 97%   BMI 20.01 kg/m        GENERAL: Alert and oriented to time place and person. Seated comfortably. In no distress.    HEAD: Atraumatic and normocephalic.    EYES: ANDREW, EOMI. No pallor. No icterus.    Oral cavity: no mucosal lesion or tonsillar enlargement.    NECK: supple. JVP normal.No thyroid enlargement.    LYMPH NODES: No palpable, cervical, axillary or inguinal lymphadenopathy.    CHEST: clear to auscultation bilaterally. Symmetrical breath movements bilaterally.    CVS: S1 and S2 are Regular rate and rhythm. No murmur or gallop or rub heard. No peripheral edema.    ABDOMEN: Soft. Not tender. Not distended. No palpable hepatomegaly or splenomegaly. No other mass palpable. Bowel sounds heard.    EXTREMITIES: Warm.    SKIN: no rash, or bruising or purpura.        Laboratory data        Recent Results (from the past 168 hour(s))   Asymptomatic COVID-19 Virus (Coronavirus) by PCR Nose    Specimen: Nose; Swab   Result Value Ref Range    SARS CoV2 PCR Negative Negative "         Imaging results        IR Chest Port Placement > 5 Yrs of Age    Result Date: 11/9/2021  [facility] LOCATION: [facility] DATE: 11/9/2021 PROCEDURES: 1. SUBCUTANEOUS IMPLANTED VENOUS ACCESS PORT. 2. ULTRASOUND GUIDANCE FOR VASCULAR ACCESS. 3. FLUOROSCOPIC GUIDANCE FOR CATHETER PLACEMENT. 4. MODERATE SEDATION INTERVENTIONAL RADIOLOGIST: Helio Putnam MD INDICATION: Patient is 65-year-old female with a history of breast cancer who presents for right-sided Port-A-Cath placement The patient presents to Interventional Radiology for placement of a Port-A-Cath for long-term central venous access to allow for infusion and blood draws. CONSENT: The risks, benefits and alternatives of Port placement were discussed with the patient  in detail. All questions were answered. Informed consent was given to proceed with the procedure. MODERATE SEDATION: Versed 2 mg IV; Fentanyl 50 mcg IV. During the time out, immediately prior to the administration of medications, the patient was reassessed for adequacy to receive conscious sedation.  Under physician supervision, Versed and fentanyl were administered for moderate sedation. Pulse oximetry, heart rate and blood pressure were continuously monitored by an independent trained observer. The physician spent 30 minutes of face-to-face sedation time with the patient. CONTRAST: None. ANTIBIOTICS: 2 g of IV Ancef. ADDITIONAL MEDICATIONS: None. FLUOROSCOPIC TIME: 1.4 minutes. RADIATION DOSE: Air Kerma: 65 mGy. COMPLICATIONS: No immediate complications. STERILE BARRIER TECHNIQUE: Maximum sterile barrier technique was used. Cutaneous antisepsis was performed at the operative site with application of 2% chlorhexidine and large sterile drape. Prior to the procedure, the  and assistant performed hand hygiene and wore hat, mask, sterile gown, and sterile gloves during the entire procedure. PROCEDURE: After discussing the procedure and risks, informed  consent was obtained. Permanent ultrasound images were obtained of the right subclavian vein, documenting patency and compressibility. The right neck and upper chest were prepped and draped. After local anesthesia, the subclavian vein was punctured under direct ultrasound guidance, and a small dilator was placed. A short transverse skin incision was made below the clavicle, and a pocket  was created for the port. A skin tunnel was created from the pocket to the vein entry site, and the catheter was pulled through the tunnel. The vein was dilated and the catheter inserted through a peel-away sheath, positioning the tip fluoroscopically at the SVC/atriocaval junction. The catheter was cut to an appropriate length. The catheter was then attached to the port itself and the port was placed within the subcutaneous pocket. The port was secured within the pocket utilizing two anchoring sutures. The port was flushed with heparin. The incision was closed with layered absorbable suture and covered with Dermabond. The patient tolerated the procedure, and there were no immediate complications. FINDINGS: Ultrasound shows an anechoic and compressible subclavian vein. A permanent ultrasound image of this was saved. After placement, fluoroscopic spot images show that the tip of the catheter is at the SVC/atriocaval junction.     IMPRESSION:  1.  Uneventful venous access port placement. This port is power injector compatible.    Total time spent  including face-to-face consultation was over 120 minutes.  Include the clinic visit, review of her clinical data chart preparation in .    This note has been dictated using voice recognition software. Any grammatical or context distortions are unintentional and inherent to the software      Signed by: Heri Powers MD, MD        Again, thank you for allowing me to participate in the care of your patient.        Sincerely,        Heri Powers MD, MD

## 2021-11-05 ENCOUNTER — LAB (OUTPATIENT)
Dept: FAMILY MEDICINE | Facility: CLINIC | Age: 66
End: 2021-11-05
Attending: INTERNAL MEDICINE
Payer: COMMERCIAL

## 2021-11-05 DIAGNOSIS — Z11.59 ENCOUNTER FOR SCREENING FOR OTHER VIRAL DISEASES: ICD-10-CM

## 2021-11-05 PROCEDURE — U0005 INFEC AGEN DETEC AMPLI PROBE: HCPCS

## 2021-11-05 PROCEDURE — U0003 INFECTIOUS AGENT DETECTION BY NUCLEIC ACID (DNA OR RNA); SEVERE ACUTE RESPIRATORY SYNDROME CORONAVIRUS 2 (SARS-COV-2) (CORONAVIRUS DISEASE [COVID-19]), AMPLIFIED PROBE TECHNIQUE, MAKING USE OF HIGH THROUGHPUT TECHNOLOGIES AS DESCRIBED BY CMS-2020-01-R: HCPCS

## 2021-11-06 LAB — SARS-COV-2 RNA RESP QL NAA+PROBE: NEGATIVE

## 2021-11-08 NOTE — H&P
Interventional Radiology - History and Physical  11/9/2021    Procedure Requested: Port placement  Requesting Provider: Heri Powers MD, MD    HPI: Jackie Urias is a 65 year old female with PMH including fibromyalgia and previous LEFT sided breast cancer s/p lumpectomy and chemotherapy 2009 and subsequent diagnosis of RIGHT sided breast DCIS s/p lumpectomy and re-excision x2 in 2019.    Bilateral screening mammogram completed 8/27/2021 with focal asymmetry.  Diagnosic mammogram completed along with needle core biopsy.  New diagnosis of RIGHT sided invasive ductal carcinoma, G2.      She is s/p BILATERAL mastectomies and right axillary sentinel lymph node biopsy 10/5/2021.    Presents today for port placement in anticipation of starting chemotherapy.     Imaging:   None applicable on file    NPO Status: [place] food, black coffee at 0630  Anticoagulation/Antiplatelets/Bleeding tendencies: None  Antibiotics: Ancef 2 g IV x1 ordered for procedure     Review of Systems: A comprehensive 10-point review of systems was performed. All systems were reviewed and negative with exception to those reported in the HPI.    PMH:  Past Medical History:   Diagnosis Date     Cancer (H)      Fibromyalgia        PSH:  Past Surgical History:   Procedure Laterality Date     BREAST SURGERY  2009    lumpectomy left breast     BREAST SURGERY  2018    lumpectomy right breast ( + 2 excisions)     CARPAL TUNNEL RELEASE RT/LT       HERNIA REPAIR      1962       ALLERGIES:  Allergies   Allergen Reactions     Hydrocortisone Rash     Hydrocortisone-ace rectal cream caused redness and blisters at application site.        MEDICATIONS:  Current Outpatient Medications   Medication     cholecalciferol 25 MCG (1000 UT) CHEW     MAGNESIUM PO     Multiple Vitamin (MULTI-VITAMINS) TABS     naproxen sodium (ALEVE) 220 MG tablet     vitamin E (TOCOPHEROL) 100 units (45 mg) capsule     XIIDRA 5 % opthalmic solution     Zinc 100 MG TABS     Current  Facility-Administered Medications   Medication     ceFAZolin (ANCEF) intermittent infusion 2 g in 100 mL dextrose PRE-MIX     lidocaine (LMX4) cream     lidocaine 1 % 0.1-1 mL     sodium chloride (PF) 0.9% PF flush 3 mL     sodium chloride (PF) 0.9% PF flush 3 mL     sodium chloride 0.9% 1000 mL TABLE SOLN     sodium chloride 0.9% infusion         LABS:  No results found for: INR   No results found for: HGB]  No results found for: PLT  No results found for: CR  No results found for: POTASSIUM      EXAM:  BP (!) 125/90   Pulse 74   Temp 98.5  F (36.9  C)   Resp 16   SpO2 99%   General:  Stable.  In no acute distress.    Neuro:  A&O x 3. Moves all extremities equally.  Resp:  Lungs clear to auscultation bilaterally.  Cardio:  S1S2 and reg, without murmur, clicks or rubs  Skin:  Without excoriations, ecchymosis, erythema, lesions or open sores on neck and chest.  Previous port site well healed.  Bilateral expanders in place.       Pre-Sedation Assessment:  Mallampati Airway Classification:  II - Faucial pillars and soft palate may be seen, but uvula is masked by the base of the tongue  Previous reaction to anesthesia/sedation:  Yes: N/v with anesthesia  Sedation plan based on assessment: Moderate (conscious) sedation  ASA Classification: Class 3 - SEVERE SYSTEMIC DISEASE, DEFINITE FUNCTIONAL LIMITATIONS.   Code Status: FULL CODE    ASSESSMENT:  66 yo F with complex underlying PMH    - LEFT sided breast cancer s/p lumpectomy and chemotherapy 2009  - RIGHT sided breast DCIS s/p lumpectomy and re-excision x2 in 2019  - Recent dx RIGHT sided invasive ductal carcinoma, G2.    - S/p BILATERAL mastectomies and right axillary sentinel lymph node biopsy 10/5/2021.  - Bilateral expanders in place  - Previous right sided port placement- well healed    Presents today for port placement in anticipation of starting chemotherapy    PLAN:    Proceed with port placement, with sedation    - Antibiotics per MAR- ancef  - No apparent  contraindications for right sided port placement based upon chart review, physical exam, and discussion with patient today      Procedure, risks/benefits, details, alternatives, and sedation reviewed with patient/family and she verbalized understanding. All questions answered. OK to proceed with above radiology procedure.         SYLVIA ALEXANDER NP  Interventional Radiology  [phone]

## 2021-11-09 ENCOUNTER — HOSPITAL ENCOUNTER (OUTPATIENT)
Dept: INTERVENTIONAL RADIOLOGY/VASCULAR | Facility: HOSPITAL | Age: 66
Discharge: HOME | End: 2021-11-09
Attending: INTERNAL MEDICINE | Admitting: RADIOLOGY
Payer: COMMERCIAL

## 2021-11-09 VITALS
TEMPERATURE: 98.7 F | HEART RATE: 70 BPM | OXYGEN SATURATION: 99 % | RESPIRATION RATE: 16 BRPM | SYSTOLIC BLOOD PRESSURE: 117 MMHG | DIASTOLIC BLOOD PRESSURE: 68 MMHG

## 2021-11-09 DIAGNOSIS — Z17.0 MALIGNANT NEOPLASM OF CENTRAL PORTION OF RIGHT BREAST IN FEMALE, ESTROGEN RECEPTOR POSITIVE (H): ICD-10-CM

## 2021-11-09 DIAGNOSIS — C50.111 MALIGNANT NEOPLASM OF CENTRAL PORTION OF RIGHT BREAST IN FEMALE, ESTROGEN RECEPTOR POSITIVE (H): ICD-10-CM

## 2021-11-09 PROCEDURE — 99153 MOD SED SAME PHYS/QHP EA: CPT

## 2021-11-09 PROCEDURE — 250N000009 HC RX 250: Performed by: NURSE PRACTITIONER

## 2021-11-09 PROCEDURE — 250N000011 HC RX IP 250 OP 636: Performed by: NURSE PRACTITIONER

## 2021-11-09 PROCEDURE — 36561 INSERT TUNNELED CV CATH: CPT

## 2021-11-09 PROCEDURE — 76937 US GUIDE VASCULAR ACCESS: CPT

## 2021-11-09 PROCEDURE — 250N000011 HC RX IP 250 OP 636: Performed by: RADIOLOGY

## 2021-11-09 PROCEDURE — C1769 GUIDE WIRE: HCPCS

## 2021-11-09 PROCEDURE — 272N000500 HC NEEDLE CR2

## 2021-11-09 PROCEDURE — C1788 PORT, INDWELLING, IMP: HCPCS

## 2021-11-09 PROCEDURE — 99152 MOD SED SAME PHYS/QHP 5/>YRS: CPT

## 2021-11-09 RX ORDER — SODIUM CHLORIDE 9 MG/ML
INJECTION, SOLUTION INTRAVENOUS CONTINUOUS
Status: DISCONTINUED | OUTPATIENT
Start: 2021-11-09 | End: 2021-11-10 | Stop reason: HOSPADM

## 2021-11-09 RX ORDER — NALOXONE HYDROCHLORIDE 0.4 MG/ML
0.4 INJECTION, SOLUTION INTRAMUSCULAR; INTRAVENOUS; SUBCUTANEOUS
Status: DISCONTINUED | OUTPATIENT
Start: 2021-11-09 | End: 2021-11-10 | Stop reason: HOSPADM

## 2021-11-09 RX ORDER — NALOXONE HYDROCHLORIDE 0.4 MG/ML
0.2 INJECTION, SOLUTION INTRAMUSCULAR; INTRAVENOUS; SUBCUTANEOUS
Status: DISCONTINUED | OUTPATIENT
Start: 2021-11-09 | End: 2021-11-10 | Stop reason: HOSPADM

## 2021-11-09 RX ORDER — HEPARIN SODIUM (PORCINE) LOCK FLUSH IV SOLN 100 UNIT/ML 100 UNIT/ML
500 SOLUTION INTRAVENOUS ONCE
Status: COMPLETED | OUTPATIENT
Start: 2021-11-09 | End: 2021-11-09

## 2021-11-09 RX ORDER — FENTANYL CITRATE 50 UG/ML
25-50 INJECTION, SOLUTION INTRAMUSCULAR; INTRAVENOUS EVERY 5 MIN PRN
Status: DISCONTINUED | OUTPATIENT
Start: 2021-11-09 | End: 2021-11-10 | Stop reason: HOSPADM

## 2021-11-09 RX ORDER — CEFAZOLIN SODIUM 2 G/100ML
2 INJECTION, SOLUTION INTRAVENOUS
Status: COMPLETED | OUTPATIENT
Start: 2021-11-09 | End: 2021-11-09

## 2021-11-09 RX ORDER — LIDOCAINE 40 MG/G
CREAM TOPICAL
Status: DISCONTINUED | OUTPATIENT
Start: 2021-11-09 | End: 2021-11-10 | Stop reason: HOSPADM

## 2021-11-09 RX ORDER — ONDANSETRON 2 MG/ML
4 INJECTION INTRAMUSCULAR; INTRAVENOUS EVERY 6 HOURS PRN
Status: DISCONTINUED | OUTPATIENT
Start: 2021-11-09 | End: 2021-11-10 | Stop reason: HOSPADM

## 2021-11-09 RX ORDER — FLUMAZENIL 0.1 MG/ML
0.2 INJECTION, SOLUTION INTRAVENOUS
Status: DISCONTINUED | OUTPATIENT
Start: 2021-11-09 | End: 2021-11-10 | Stop reason: HOSPADM

## 2021-11-09 RX ADMIN — MIDAZOLAM HYDROCHLORIDE 0.5 MG: 1 INJECTION, SOLUTION INTRAMUSCULAR; INTRAVENOUS at 10:15

## 2021-11-09 RX ADMIN — MIDAZOLAM HYDROCHLORIDE 1 MG: 1 INJECTION, SOLUTION INTRAMUSCULAR; INTRAVENOUS at 09:59

## 2021-11-09 RX ADMIN — LIDOCAINE HYDROCHLORIDE 10 ML: 10 INJECTION, SOLUTION INFILTRATION; PERINEURAL at 10:18

## 2021-11-09 RX ADMIN — FENTANYL CITRATE 50 MCG: 50 INJECTION, SOLUTION INTRAMUSCULAR; INTRAVENOUS at 10:02

## 2021-11-09 RX ADMIN — CEFAZOLIN SODIUM 2 G: 2 INJECTION, SOLUTION INTRAVENOUS at 09:42

## 2021-11-09 RX ADMIN — MIDAZOLAM HYDROCHLORIDE 0.5 MG: 1 INJECTION, SOLUTION INTRAMUSCULAR; INTRAVENOUS at 10:06

## 2021-11-09 RX ADMIN — HEPARIN SODIUM (PORCINE) LOCK FLUSH IV SOLN 100 UNIT/ML 500 UNITS: 100 SOLUTION at 10:16

## 2021-11-09 NOTE — PROGRESS NOTES
[facility] Hematology and Oncology Consult Note    Patient: Jackie Urias  MRN: [id number]  Date of Service: Nov 4, 2021           Reason for consultation      Problem List Items Addressed This Visit        Other    Malignant neoplasm of central portion of right breast in female, estrogen receptor positive (H) - Primary    Relevant Orders    Infusion Appointment Request    Infusion Appointment Request    Infusion Appointment Request    Infusion Appointment Request    Infusion Appointment Request    IR Chest Port Placement > 5 Yrs of Age (Completed)    Asymptomatic COVID-19 Virus (Coronavirus) by PCR    Echocardiogram Limited      Other Visit Diagnoses     CLL (chronic lymphocytic leukemia) (H)                Assessment      1.  Very pleasant 65-year-old woman with invasive ductal carcinoma right breast ER positive KS positive HER-2/chapin positive by FISH.  2.  Status post bilateral mastectomy with reconstruction.  She has implants expanders in place.  3.  Prior history of similar breast cancer on the left side 13 years ago in 2009.  At that time she did have 1 lymph node positive.  Got treatment with dose dense AC followed by weekly paclitaxel and Herceptin.  Herceptin was for over a year.  She did get tamoxifen as a hormonal therapy.  She also got adjuvant radiation therapy to the breast.  4.  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  Required 3 surgeries positive margin.  I believe no hormone therapy was given.  5.  Genetic testing in progress.  6.  Incidentally found to have CLL on her lymph node.    Plan      1.  Due to the size of the lesion I think I would recommend treatment with weekly paclitaxel and Herceptin.  We did talk about using Kadcyla in her situation.  There is a clinical trial data showing Kadcyla may have perhaps marginally better outcome.  The side effect profile is not different from weekly paclitaxel Herceptin.  2.  Get cardiac evaluation prior to getting started on  Herceptin.  3.  Discussed with the patient that she will have hair loss due to Taxol.  4.  After finishing chemotherapy she will be started on hormonal therapy with anastrozole.  5.  Discussed with the patient the side effect that she can experience from this treatment.  Neuropathy would be particularly something that she will be prone to do because she has had prior exposure to taxane.  6.  Continue good and proper exercise.  7.  Continue follow-up with the plastic surgeon to finish her reconstruction process.  8.  We will monitor her for CLL.  At this time does not need any treatment.  Down the road we may do cytogenetics to assess her risk profile.    Clinical/pathological stage      Stage I, T1c N0 M0.  ER positive NV positive HER-2/chapin positive by FISH.  Right breast October 2021      History of present illness        Ms. Jackie Urias is a 65 year old postmenopausal woman who has been diagnosed with right-sided breast cancer located at 11 o'clock position presenting as focal asymmetry on the mammogram in August 2021.  This was on a routine mammogram screening timeframe.  She did not have any palpable lesion.  Diagnostic imaging revealed that she had a 3.6 cm mass at the position along with some other calcification.  She underwent biopsy which confirmed invasive ductal carcinoma ER positive NV positive HER-2 new positive by FISH.  She then also underwent biopsy of the right axillary lymph node which was negative for any metastatic carcinoma.    She underwent surgery in 5 October 2021 in the form of bilateral mastectomies with right sentinel lymph node biopsy and reconstruction bilaterally with tissue expander placement.  Pathology revealed invasive ductal carcinoma grade 211 mm in size extensive DCIS no lymphovascular invasion.  Final stage I, T1c N0 M0.    Interestingly her lymph nodes did show some evidence of CLL.    She is a prior history of left-sided breast cancer diagnosed in 24 August 2019 when she  was 52 years old.  This was also ER positive LA positive and HER-2/chapin 3+.  She had surgery in 3 September 2009 in the form of lumpectomy and sentinel lymph node biopsy.  1.2 cm tumor.  1 lymph node positive.  14 additional lymph nodes were negative.    Following that she was treated with dose dense Adriamycin and cyclophosphamide while followed by weekly paclitaxel and Herceptin.  She also got radiation therapy and tamoxifen for 5 years.    She also was found to have a DCIS in the right breast intermediate grade ER positive in September 2018.  She underwent right breast partial mastectomy on 19 October 2018.  This was a 3.2 cm DCIS with positive margin.  No invasive carcinoma was noted.  She had reexcision on 25 October 2018 which did show small amount of residual DCIS and the margins were still not clear.    She underwent reexcision on 8 January 2019 by Dr. Cox which was negative for DCIS or any invasive carcinoma.    She has undergone genetic testing and it is still pending.    She has been seen by Dr. Bland from [facility].  She is here for second opinion.      Detailed review of systems      No fever or night sweats.  No loss of weight.  No lumps or bumps anywhere.  No unusual headaches or eyesight issues.  No dizziness.  No bleeding from the nose.  No sores in the mouth. No problems with swallowing.  No chest pain. No shortness of breath. No cough.  No abdominal pain. No nausea or vomiting.  No diarrhea or constipation.  No blood in stool or black colored stools.  No problems passing urine.  No numbness or tingling in hands or feet.  No skin rashes.  A 14 point review of systems is otherwise negative.      Past medical/surgical/social/family history        Past Medical History:   Diagnosis Date     Cancer (H)      Fibromyalgia      GERD (gastroesophageal reflux disease)      Hernia, hiatal      Past Surgical History:   Procedure Laterality Date     BREAST SURGERY  2009    lumpectomy left breast     BREAST  "SURGERY  2018    lumpectomy right breast ( + 2 excisions)     CARPAL TUNNEL RELEASE RT/LT       HERNIA REPAIR           IR CHEST PORT PLACEMENT > 5 YRS OF AGE  2021     Family History   Problem Relation Age of Onset     Breast Cancer Mother      Social History     Socioeconomic History     Marital status:      Spouse name: None     Number of children: None     Years of education: None     Highest education level: None   Occupational History     None   Tobacco Use     Smoking status: Former Smoker     Packs/day: 1.00     Years: 30.00     Pack years: 30.00     Types: Cigarettes     Quit date: 2008     Years since quittin.7     Smokeless tobacco: Never Used   Substance and Sexual Activity     Alcohol use: Yes     Comment: socially/once monthly     Drug use: No     Sexual activity: None   Other Topics Concern     Parent/sibling w/ CABG, MI or angioplasty before 65F 55M? Not Asked   Social History Narrative     None     Social Determinants of Health     Financial Resource Strain: Not on file   Food Insecurity: Not on file   Transportation Needs: Not on file   Physical Activity: Not on file   Stress: Not on file   Social Connections: Not on file   Intimate Partner Violence: Not on file   Housing Stability: Not on file         Allergies        Allergies   Allergen Reactions     Hydrocortisone Rash     Hydrocortisone-ace rectal cream caused redness and blisters at application site.        Physical exam        BP (!) 153/96 (BP Location: Right arm, Cuff Size: Adult Regular)   Pulse 104   Resp 16   Ht 1.676 m (5' 6\")   Wt 56.2 kg (124 lb)   SpO2 97%   BMI 20.01 kg/m        GENERAL: Alert and oriented to time place and person. Seated comfortably. In no distress.    HEAD: Atraumatic and normocephalic.    EYES: ANDREW, EOMI. No pallor. No icterus.    Oral cavity: no mucosal lesion or tonsillar enlargement.    NECK: supple. JVP normal.No thyroid enlargement.    LYMPH NODES: No palpable, cervical, " axillary or inguinal lymphadenopathy.    CHEST: clear to auscultation bilaterally. Symmetrical breath movements bilaterally.    CVS: S1 and S2 are Regular rate and rhythm. No murmur or gallop or rub heard. No peripheral edema.    ABDOMEN: Soft. Not tender. Not distended. No palpable hepatomegaly or splenomegaly. No other mass palpable. Bowel sounds heard.    EXTREMITIES: Warm.    SKIN: no rash, or bruising or purpura.        Laboratory data        Recent Results (from the past 168 hour(s))   Asymptomatic COVID-19 Virus (Coronavirus) by PCR Nose    Specimen: Nose; Swab   Result Value Ref Range    SARS CoV2 PCR Negative Negative         Imaging results        IR Chest Port Placement > 5 Yrs of Age    Result Date: 11/9/2021  [facility] LOCATION: [facility] DATE: 11/9/2021 PROCEDURES: 1. SUBCUTANEOUS IMPLANTED VENOUS ACCESS PORT. 2. ULTRASOUND GUIDANCE FOR VASCULAR ACCESS. 3. FLUOROSCOPIC GUIDANCE FOR CATHETER PLACEMENT. 4. MODERATE SEDATION INTERVENTIONAL RADIOLOGIST: Helio Putnam MD INDICATION: Patient is 65-year-old female with a history of breast cancer who presents for right-sided Port-A-Cath placement The patient presents to Interventional Radiology for placement of a Port-A-Cath for long-term central venous access to allow for infusion and blood draws. CONSENT: The risks, benefits and alternatives of Port placement were discussed with the patient  in detail. All questions were answered. Informed consent was given to proceed with the procedure. MODERATE SEDATION: Versed 2 mg IV; Fentanyl 50 mcg IV. During the time out, immediately prior to the administration of medications, the patient was reassessed for adequacy to receive conscious sedation.  Under physician supervision, Versed and fentanyl were administered for moderate sedation. Pulse oximetry, heart rate and blood pressure were continuously monitored by an independent trained observer. The physician spent 30 minutes of  face-to-face sedation time with the patient. CONTRAST: None. ANTIBIOTICS: 2 g of IV Ancef. ADDITIONAL MEDICATIONS: None. FLUOROSCOPIC TIME: 1.4 minutes. RADIATION DOSE: Air Kerma: 65 mGy. COMPLICATIONS: No immediate complications. STERILE BARRIER TECHNIQUE: Maximum sterile barrier technique was used. Cutaneous antisepsis was performed at the operative site with application of 2% chlorhexidine and large sterile drape. Prior to the procedure, the  and assistant performed hand hygiene and wore hat, mask, sterile gown, and sterile gloves during the entire procedure. PROCEDURE: After discussing the procedure and risks, informed consent was obtained. Permanent ultrasound images were obtained of the right subclavian vein, documenting patency and compressibility. The right neck and upper chest were prepped and draped. After local anesthesia, the subclavian vein was punctured under direct ultrasound guidance, and a small dilator was placed. A short transverse skin incision was made below the clavicle, and a pocket  was created for the port. A skin tunnel was created from the pocket to the vein entry site, and the catheter was pulled through the tunnel. The vein was dilated and the catheter inserted through a peel-away sheath, positioning the tip fluoroscopically at the SVC/atriocaval junction. The catheter was cut to an appropriate length. The catheter was then attached to the port itself and the port was placed within the subcutaneous pocket. The port was secured within the pocket utilizing two anchoring sutures. The port was flushed with heparin. The incision was closed with layered absorbable suture and covered with Dermabond. The patient tolerated the procedure, and there were no immediate complications. FINDINGS: Ultrasound shows an anechoic and compressible subclavian vein. A permanent ultrasound image of this was saved. After placement, fluoroscopic spot images show that the tip of the catheter is at the  SVC/atriocaval junction.     IMPRESSION:  1.  Uneventful venous access port placement. This port is power injector compatible.    Total time spent  including face-to-face consultation was over 120 minutes.  Include the clinic visit, review of her clinical data chart preparation in .    This note has been dictated using voice recognition software. Any grammatical or context distortions are unintentional and inherent to the software      Signed by: Heri Powers MD, MD

## 2021-11-09 NOTE — IP AVS SNAPSHOT
[facility] Interventional Radiology  [address] [place] [id number]  Phone: [phone]  Fax: [phone]                                  After Visit Summary   11/9/2021    Jackie Urias   MRN: [id number]           After Visit Summary Signature Page    I have received my discharge instructions, and my questions have been answered. I have discussed any challenges I see with this plan with the nurse or doctor.    ..........................................................................................................................................  Patient/Patient Representative Signature      ..........................................................................................................................................  Patient Representative Print Name and Relationship to Patient    ..................................................               ................................................  Date                                   Time    ..........................................................................................................................................  Reviewed by Signature/Title    ...................................................              ..............................................  Date                                               Time          22EPIC Rev 08/18

## 2021-11-09 NOTE — DISCHARGE INSTRUCTIONS
Port Placement Procedure Discharge Instructions:  You had a port placed. A port is a small medical device that is placed under the skin and is connected to a vein with a catheter (thin, flexible tube). Ports can be used to administer IV medications, fluids or blood products (including chemotherapy) or for blood lab draws. Please follow the below instructions:  Care Instructions:  - If you received sedation for your procedure, do not drive or operate heavy machinery for the rest of the day.  - You may shower beginning post procedure day #1.  Do not scrub site until well healed; pat dry.  - Avoid submerging the port site under water (tub baths, Jacuzzis, hot tubs and pools) for 10 days or until glue falls off.  - You may take over the counter pain medication for discomfort. Follow the package directions.  - Avoid heavy lifting (greater than 10 pounds) and strenuous activities for 3 days.   - If you experience significant bleeding at site, apply pressure with hands above the clavicle bone, sit upright and seek immediate medical assistance.    Call [facility] ([phone]) if you experience the following:   - Uncontrolled bleeding from port site  - Fever (greater than 101 F (38.3C))  - Purulent (yellow/green/foul smelling) drainage from port insertion site.  - Increasing pain at port site  - Increasing redness at port site

## 2021-11-09 NOTE — PRE-PROCEDURE
GENERAL PRE-PROCEDURE:   Procedure:  Port placement  Date/Time:  11/9/2021 9:18 AM    Written consent obtained?: Yes    Risks and benefits: Risks, benefits and alternatives were discussed    Consent given by:  Patient  Patient states understanding of procedure being performed: Yes    Patient's understanding of procedure matches consent: Yes    Procedure consent matches procedure scheduled: Yes    Expected level of sedation:  Moderate  Appropriately NPO:  Yes  ASA Class:  3  Mallampati  :  Grade 1- soft palate, uvula, tonsillar pillars, and posterior pharyngeal wall visible  Lungs:  Lungs clear with good breath sounds bilaterally  Heart:  Normal heart sounds and rate  History & Physical reviewed:  History and physical reviewed and no updates needed  Statement of review:  I have reviewed the lab findings, diagnostic data, medications, and the plan for sedation

## 2021-11-09 NOTE — PROCEDURES
RADIOLOGY POST PROCEDURE NOTE WITH SEDATION  Patient name: Jackie Urias  MRN: [id number]  : 1955    Pre-procedure diagnosis: Breast cancer  Post-procedure diagnosis: Same    Procedure Date/Time: 2021  10:39 AM    Procedure: Port-a-cath placement  Estimated blood loss: Minimal  Sedation: Moderate sedation was employed. The patient was monitored by a nurse at all times during the procedure under my direct supervision.    Specimen(s) collected with description: None    I determined this patient to be an appropriate candidate for the planned sedation and procedure and reassessed the patient IMMEDIATELY PRIOR to sedation and procedure.     The patient tolerated the procedure well with no immediate complications.    Significant findings: Successful placement of port-a-cath. Ready for immediate use.    See imaging dictation for procedural details.    Provider name: Helio Putnam M.D.  Assistant(s):None

## 2021-11-10 ENCOUNTER — TELEPHONE (OUTPATIENT)
Dept: INTERVENTIONAL RADIOLOGY/VASCULAR | Facility: HOSPITAL | Age: 66
End: 2021-11-10
Payer: COMMERCIAL

## 2021-11-12 ENCOUNTER — HOSPITAL ENCOUNTER (OUTPATIENT)
Dept: CARDIOLOGY | Facility: CLINIC | Age: 66
Discharge: HOME | End: 2021-11-12
Attending: INTERNAL MEDICINE | Admitting: INTERNAL MEDICINE
Payer: COMMERCIAL

## 2021-11-12 DIAGNOSIS — Z17.0 MALIGNANT NEOPLASM OF CENTRAL PORTION OF RIGHT BREAST IN FEMALE, ESTROGEN RECEPTOR POSITIVE (H): ICD-10-CM

## 2021-11-12 DIAGNOSIS — C50.111 MALIGNANT NEOPLASM OF CENTRAL PORTION OF RIGHT BREAST IN FEMALE, ESTROGEN RECEPTOR POSITIVE (H): ICD-10-CM

## 2021-11-12 PROCEDURE — 93325 DOPPLER ECHO COLOR FLOW MAPG: CPT | Mod: 26 | Performed by: INTERNAL MEDICINE

## 2021-11-12 PROCEDURE — 93325 DOPPLER ECHO COLOR FLOW MAPG: CPT

## 2021-11-12 PROCEDURE — 93308 TTE F-UP OR LMTD: CPT | Mod: 26 | Performed by: INTERNAL MEDICINE

## 2021-11-12 PROCEDURE — 93321 DOPPLER ECHO F-UP/LMTD STD: CPT | Mod: 26 | Performed by: INTERNAL MEDICINE

## 2021-11-15 ENCOUNTER — INFUSION THERAPY VISIT (OUTPATIENT)
Dept: INFUSION THERAPY | Facility: HOSPITAL | Age: 66
End: 2021-11-15
Attending: INTERNAL MEDICINE
Payer: COMMERCIAL

## 2021-11-15 ENCOUNTER — ONCOLOGY VISIT (OUTPATIENT)
Dept: ONCOLOGY | Facility: HOSPITAL | Age: 66
End: 2021-11-15
Attending: INTERNAL MEDICINE
Payer: COMMERCIAL

## 2021-11-15 VITALS
HEART RATE: 80 BPM | OXYGEN SATURATION: 98 % | SYSTOLIC BLOOD PRESSURE: 123 MMHG | RESPIRATION RATE: 16 BRPM | DIASTOLIC BLOOD PRESSURE: 76 MMHG

## 2021-11-15 VITALS
RESPIRATION RATE: 16 BRPM | DIASTOLIC BLOOD PRESSURE: 89 MMHG | BODY MASS INDEX: 20.18 KG/M2 | OXYGEN SATURATION: 98 % | HEART RATE: 101 BPM | SYSTOLIC BLOOD PRESSURE: 139 MMHG | WEIGHT: 125 LBS | TEMPERATURE: 98.4 F

## 2021-11-15 DIAGNOSIS — C50.111 MALIGNANT NEOPLASM OF CENTRAL PORTION OF RIGHT BREAST IN FEMALE, ESTROGEN RECEPTOR POSITIVE (H): Primary | ICD-10-CM

## 2021-11-15 DIAGNOSIS — Z17.0 MALIGNANT NEOPLASM OF CENTRAL PORTION OF RIGHT BREAST IN FEMALE, ESTROGEN RECEPTOR POSITIVE (H): Primary | ICD-10-CM

## 2021-11-15 DIAGNOSIS — C91.10 CLL (CHRONIC LYMPHOCYTIC LEUKEMIA) (H): ICD-10-CM

## 2021-11-15 LAB
ALBUMIN SERPL-MCNC: 3.9 G/DL (ref 3.5–5)
ALP SERPL-CCNC: 59 U/L (ref 45–120)
ALT SERPL W P-5'-P-CCNC: <9 U/L (ref 0–45)
AST SERPL W P-5'-P-CCNC: 14 U/L (ref 0–40)
BASOPHILS # BLD MANUAL: 0 10E3/UL (ref 0–0.2)
BASOPHILS NFR BLD MANUAL: 0 %
BILIRUB DIRECT SERPL-MCNC: 0.2 MG/DL
BILIRUB SERPL-MCNC: 0.5 MG/DL (ref 0–1)
EOSINOPHIL # BLD MANUAL: 0.6 10E3/UL (ref 0–0.7)
EOSINOPHIL NFR BLD MANUAL: 3 %
ERYTHROCYTE [DISTWIDTH] IN BLOOD BY AUTOMATED COUNT: 12.5 % (ref 10–15)
HCT VFR BLD AUTO: 37.8 % (ref 35–47)
HGB BLD-MCNC: 12.3 G/DL (ref 11.7–15.7)
LYMPHOCYTES # BLD MANUAL: 15 10E3/UL (ref 0.8–5.3)
LYMPHOCYTES NFR BLD MANUAL: 76 %
MCH RBC QN AUTO: 31.1 PG (ref 26.5–33)
MCHC RBC AUTO-ENTMCNC: 32.5 G/DL (ref 31.5–36.5)
MCV RBC AUTO: 96 FL (ref 78–100)
MONOCYTES # BLD MANUAL: 0.2 10E3/UL (ref 0–1.3)
MONOCYTES NFR BLD MANUAL: 1 %
NEUTROPHILS # BLD MANUAL: 4 10E3/UL (ref 1.6–8.3)
NEUTROPHILS NFR BLD MANUAL: 20 %
PLAT MORPH BLD: ABNORMAL
PLATELET # BLD AUTO: 269 10E3/UL (ref 150–450)
PROT SERPL-MCNC: 6.6 G/DL (ref 6–8)
RBC # BLD AUTO: 3.96 10E6/UL (ref 3.8–5.2)
RBC MORPH BLD: ABNORMAL
WBC # BLD AUTO: 19.8 10E3/UL (ref 4–11)

## 2021-11-15 PROCEDURE — 96366 THER/PROPH/DIAG IV INF ADDON: CPT

## 2021-11-15 PROCEDURE — 250N000013 HC RX MED GY IP 250 OP 250 PS 637: Performed by: INTERNAL MEDICINE

## 2021-11-15 PROCEDURE — 250N000011 HC RX IP 250 OP 636: Performed by: INTERNAL MEDICINE

## 2021-11-15 PROCEDURE — 96376 TX/PRO/DX INJ SAME DRUG ADON: CPT

## 2021-11-15 PROCEDURE — 85027 COMPLETE CBC AUTOMATED: CPT | Performed by: INTERNAL MEDICINE

## 2021-11-15 PROCEDURE — G0463 HOSPITAL OUTPT CLINIC VISIT: HCPCS | Mod: 25

## 2021-11-15 PROCEDURE — 80076 HEPATIC FUNCTION PANEL: CPT | Performed by: INTERNAL MEDICINE

## 2021-11-15 PROCEDURE — 258N000003 HC RX IP 258 OP 636: Performed by: INTERNAL MEDICINE

## 2021-11-15 PROCEDURE — 250N000009 HC RX 250: Performed by: INTERNAL MEDICINE

## 2021-11-15 PROCEDURE — 99214 OFFICE O/P EST MOD 30 MIN: CPT | Performed by: NURSE PRACTITIONER

## 2021-11-15 PROCEDURE — 96367 TX/PROPH/DG ADDL SEQ IV INF: CPT

## 2021-11-15 PROCEDURE — 96409 CHEMO IV PUSH SNGL DRUG: CPT

## 2021-11-15 PROCEDURE — 96375 TX/PRO/DX INJ NEW DRUG ADDON: CPT

## 2021-11-15 RX ORDER — ALBUTEROL SULFATE 0.83 MG/ML
2.5 SOLUTION RESPIRATORY (INHALATION)
Status: DISCONTINUED | OUTPATIENT
Start: 2021-11-15 | End: 2021-11-15 | Stop reason: HOSPADM

## 2021-11-15 RX ORDER — ALBUTEROL SULFATE 90 UG/1
1-2 AEROSOL, METERED RESPIRATORY (INHALATION)
Status: DISCONTINUED | OUTPATIENT
Start: 2021-11-15 | End: 2021-11-15 | Stop reason: HOSPADM

## 2021-11-15 RX ORDER — ACETAMINOPHEN 325 MG/1
650 TABLET ORAL ONCE
Status: COMPLETED | OUTPATIENT
Start: 2021-11-15 | End: 2021-11-15

## 2021-11-15 RX ORDER — METHYLPREDNISOLONE SODIUM SUCCINATE 125 MG/2ML
125 INJECTION, POWDER, LYOPHILIZED, FOR SOLUTION INTRAMUSCULAR; INTRAVENOUS
Status: DISCONTINUED | OUTPATIENT
Start: 2021-11-15 | End: 2021-11-15 | Stop reason: HOSPADM

## 2021-11-15 RX ORDER — MEPERIDINE HYDROCHLORIDE 25 MG/ML
25 INJECTION INTRAMUSCULAR; INTRAVENOUS; SUBCUTANEOUS EVERY 30 MIN PRN
Status: DISCONTINUED | OUTPATIENT
Start: 2021-11-15 | End: 2021-11-15 | Stop reason: HOSPADM

## 2021-11-15 RX ORDER — PROCHLORPERAZINE MALEATE 10 MG
10 TABLET ORAL EVERY 6 HOURS PRN
Qty: 30 TABLET | Refills: 3 | Status: SHIPPED | OUTPATIENT
Start: 2021-11-15

## 2021-11-15 RX ORDER — NALOXONE HYDROCHLORIDE 0.4 MG/ML
0.2 INJECTION, SOLUTION INTRAMUSCULAR; INTRAVENOUS; SUBCUTANEOUS
Status: DISCONTINUED | OUTPATIENT
Start: 2021-11-15 | End: 2021-11-15 | Stop reason: HOSPADM

## 2021-11-15 RX ORDER — DIPHENHYDRAMINE HYDROCHLORIDE 50 MG/ML
50 INJECTION INTRAMUSCULAR; INTRAVENOUS
Status: DISCONTINUED | OUTPATIENT
Start: 2021-11-15 | End: 2021-11-15 | Stop reason: HOSPADM

## 2021-11-15 RX ORDER — HEPARIN SODIUM (PORCINE) LOCK FLUSH IV SOLN 100 UNIT/ML 100 UNIT/ML
5 SOLUTION INTRAVENOUS
Status: DISCONTINUED | OUTPATIENT
Start: 2021-11-15 | End: 2021-11-15 | Stop reason: HOSPADM

## 2021-11-15 RX ORDER — EPINEPHRINE 1 MG/ML
0.3 INJECTION, SOLUTION INTRAMUSCULAR; SUBCUTANEOUS EVERY 5 MIN PRN
Status: DISCONTINUED | OUTPATIENT
Start: 2021-11-15 | End: 2021-11-15 | Stop reason: HOSPADM

## 2021-11-15 RX ADMIN — SODIUM CHLORIDE 250 ML: 9 INJECTION, SOLUTION INTRAVENOUS at 12:26

## 2021-11-15 RX ADMIN — FAMOTIDINE 20 MG: 10 INJECTION, SOLUTION INTRAVENOUS at 15:00

## 2021-11-15 RX ADMIN — SODIUM CHLORIDE 500 ML: 9 INJECTION, SOLUTION INTRAVENOUS at 15:10

## 2021-11-15 RX ADMIN — HEPARIN SODIUM (PORCINE) LOCK FLUSH IV SOLN 100 UNIT/ML 5 ML: 100 SOLUTION at 15:52

## 2021-11-15 RX ADMIN — ACETAMINOPHEN 650 MG: 325 TABLET ORAL at 12:29

## 2021-11-15 RX ADMIN — PACLITAXEL 130 MG: 300 INJECTION, SOLUTION INTRAVENOUS at 14:50

## 2021-11-15 RX ADMIN — FAMOTIDINE 20 MG: 10 INJECTION, SOLUTION INTRAVENOUS at 12:26

## 2021-11-15 RX ADMIN — METHYLPREDNISOLONE SODIUM SUCCINATE 125 MG: 125 INJECTION, POWDER, FOR SOLUTION INTRAMUSCULAR; INTRAVENOUS at 14:57

## 2021-11-15 RX ADMIN — DIPHENHYDRAMINE HYDROCHLORIDE 50 MG: 50 INJECTION INTRAMUSCULAR; INTRAVENOUS at 12:30

## 2021-11-15 RX ADMIN — SODIUM CHLORIDE 224 MG: 9 INJECTION, SOLUTION INTRAVENOUS at 13:16

## 2021-11-15 RX ADMIN — DEXAMETHASONE SODIUM PHOSPHATE 20 MG: 10 INJECTION, SOLUTION INTRAMUSCULAR; INTRAVENOUS at 12:47

## 2021-11-15 ASSESSMENT — PAIN SCALES - GENERAL: PAINLEVEL: NO PAIN (0)

## 2021-11-15 NOTE — PROGRESS NOTES
Pt arrived ambulatory to clinic for Cycle # 1 Day # 1 of her chemotherapy regimen.  Port was accessed using aseptic technique without difficulties with excellent blood return.  Gris reviewed medications, potential side effects, and obtained consent.  Pt has had chemotherapy in [id number], so abbreviated chemotherapy class was given in infusion chair.  Gave pt free thermometer and chemotherapy education packet.  Administered premedications early since pt drove herself to appt.  Pt tolerated Herceptin well, no s/s of infusion reaction.  Pt developed flushing, chest tightness, hypertension, flank pain, and very hot 18 mL into Taxol infusion.  Medication was stopped, NS was started, pt was given Methylprednisolone and additional dose of Pepcid.  Pt declined additional Benadryl since she is driving herself home.  Pt really did not want to re-challenge her Taxol today due to severity of her Taxol reaction.  Gris states that they will switch pt to Abraxane for future doses.  Pt wants to see provider prior to her next dose of chemotherapy, so  will add provider visit to the 11/22 appt.  Pt was given 800 mL of NS while waiting for symptoms to resolve.  Pt's BP, coloring, temperature, and pain were improved prior to leaving.  Port was flushed with NS and Heparin then de-accessed using 2x2 and papertape.  Pt verbalized understanding of plan of care and return to clinic.

## 2021-11-15 NOTE — PROGRESS NOTES
[facility] Hematology and Oncology Progress Note    Patient: Jackie Urias  MRN: [id number]  Date of Service: 11/15/2021        Reason for Visit    Chief Complaint   Patient presents with     Oncology Clinic Visit     Malignant neoplasm of central portion of right breast in female, estrogen receptor positive (H)       Assessment and Plan    Cancer Staging  Malignant neoplasm of central portion of right breast in female, estrogen receptor positive (H)  Staging form: Breast, AJCC 8th Edition  - Pathologic stage from 10/5/2021: Stage IA (pT1c, pN0, cM0, G2, ER+, NV+, HER2+) - Signed by Gris Benjamin APRN CNP on 11/15/2021    1.  Breast cancer, right side, stage I: Patient is triple positive and is here today to start adjuvant chemotherapy with Taxol and Herceptin.  The plan is to do this weekly for 12 weeks.  Patient gave verbal consent.  She understands the side effects and they include but are not limited to: alopecia, diarrhea/constipation, nausea, vomiting, fatigue/weakness, myelosuppression, the pain, peripheral neuropathy, taste alterations, poor appetite. They understand this is with curable intent.  Patient will try to get her treatment up at [place] as much as she can.  She lives close to that facility.  Patient should be seen roughly every 3 weeks while she is on this treatment.  She will then be seen every 6 weeks when she is on her maintenance Herceptin.  She did have an echocardiogram done and that was normal.  That should be done every 3 to 4 months.    2.  Incidental finding of CLL on her lymph nodes: Her white blood cell count is elevated.  The majority is lymphocytes.  Her hemoglobin and platelets are normal.  Continue to monitor her blood counts.    ECOG Performance    0 - Independent    Distress Screening (within last 30 days)    No data recorded     Pain  Pain Score: No Pain (0)    Problem List    Patient Active Problem List   Diagnosis     Breast cancer, stage 2 (H)     Fibromyalgia      Atopic dermatitis     Ductal carcinoma in situ (DCIS) of right breast     Family history of breast cancer in male     History of breast implant or prosthesis     History of left breast cancer     Malignant neoplasm of central portion of right breast in female, estrogen receptor positive (H)     CLL (chronic lymphocytic leukemia) (H)        ______________________________________________________________________________    History of Present Illness    Ms. Jackie Urias is a 65 year old postmenopausal woman who has been diagnosed with right-sided breast cancer located at 11 o'clock position presenting as focal asymmetry on the mammogram in August 2021.  This was on a routine mammogram screening timeframe.  She did not have any palpable lesion.  Diagnostic imaging revealed that she had a 3.6 cm mass at the position along with some other calcification.  She underwent biopsy which confirmed invasive ductal carcinoma ER positive KS positive HER-2 new positive by FISH.  She then also underwent biopsy of the right axillary lymph node which was negative for any metastatic carcinoma.     She underwent surgery in 5 October 2021 in the form of bilateral mastectomies with right sentinel lymph node biopsy and reconstruction bilaterally with tissue expander placement.  Pathology revealed invasive ductal carcinoma grade 2, 11 mm in size extensive DCIS no lymphovascular invasion.  Final stage I, T1c N0 M0.     Interestingly her lymph nodes did show some evidence of CLL, she is a new diagnosis for her as well.     She is a prior history of left-sided breast cancer diagnosed in 24 August 2019 when she was 52 years old.  This was also ER positive KS positive and HER-2/chapin 3+.  She had surgery in 3 September 2009 in the form of lumpectomy and sentinel lymph node biopsy.  1.2 cm tumor.  1 lymph node positive.  14 additional lymph nodes were negative.     Following that she was treated with dose dense Adriamycin and cyclophosphamide  while followed by weekly paclitaxel and Herceptin.  She also got radiation therapy and tamoxifen for 5 years.     She also was found to have a DCIS in the right breast intermediate grade ER positive in September 2018.  She underwent right breast partial mastectomy on 19 October 2018.  This was a 3.2 cm DCIS with positive margin.  No invasive carcinoma was noted.  She had reexcision on 25 October 2018 which did show small amount of residual DCIS and the margins were still not clear.     She underwent reexcision on 8 January 2019 by Dr. Cox which was negative for DCIS or any invasive carcinoma.     She has undergone genetic testing and it is still pending.     She saw Dr. Powers in our clinic last week and he recommended that she do adjuvant chemotherapy due to the size of the tumor and that it is HER-2 positive.  She was agreeable to that so she is here today to start Taxol and Herceptin.  She will do this weekly for 12 weeks and then will go on to maintenance Herceptin to complete 1 year.  Patient does have a history of previously being on chemotherapy but states she did not have really much neuropathy but her biggest fears that she will have worsening of her fibromyalgia and body achiness and pain because that is what happened with her last chemotherapy.       Review of Systems    Pertinent items are noted in HPI.    Past History    Past Medical History:   Diagnosis Date     Cancer (H)      Fibromyalgia      GERD (gastroesophageal reflux disease)      Hernia, hiatal        PHYSICAL EXAM  /89   Pulse 101   Temp 98.4  F (36.9  C)   Resp 16   Wt 56.7 kg (125 lb)   SpO2 98%   BMI 20.18 kg/m      GENERAL: no acute distress. Cooperative in conversation. Here alone. Mask on  RESP: Regular respiratory rate. No expiratory wheezes   MUSCULOSKELETAL: no bilateral leg swelling  NEURO: non focal. Alert and oriented x3.   PSYCH: within normal limits. No depression or anxiety.  SKIN: exposed skin is dry intact.      Lab Results    Recent Results (from the past 168 hour(s))   Hepatic panel   Result Value Ref Range    Bilirubin Total 0.5 0.0 - 1.0 mg/dL    Bilirubin Direct 0.2 <=0.5 mg/dL    Protein Total 6.6 6.0 - 8.0 g/dL    Albumin 3.9 3.5 - 5.0 g/dL    Alkaline Phosphatase 59 45 - 120 U/L    AST 14 0 - 40 U/L    ALT <9 0 - 45 U/L   CBC with platelets and differential   Result Value Ref Range    WBC Count 19.8 (H) 4.0 - 11.0 10e3/uL    RBC Count 3.96 3.80 - 5.20 10e6/uL    Hemoglobin 12.3 11.7 - 15.7 g/dL    Hematocrit 37.8 35.0 - 47.0 %    MCV 96 78 - 100 fL    MCH 31.1 26.5 - 33.0 pg    MCHC 32.5 31.5 - 36.5 g/dL    RDW 12.5 10.0 - 15.0 %    Platelet Count 269 150 - 450 10e3/uL   Manual Differential   Result Value Ref Range    % Neutrophils 20 %    % Lymphocytes 76 %    % Monocytes 1 %    % Eosinophils 3 %    % Basophils 0 %    Absolute Neutrophils 4.0 1.6 - 8.3 10e3/uL    Absolute Lymphocytes 15.0 (H) 0.8 - 5.3 10e3/uL    Absolute Monocytes 0.2 0.0 - 1.3 10e3/uL    Absolute Eosinophils 0.6 0.0 - 0.7 10e3/uL    Absolute Basophils 0.0 0.0 - 0.2 10e3/uL    RBC Morphology Confirmed RBC Indices     Platelet Assessment  Automated Count Confirmed. Platelet morphology is normal.     Automated Count Confirmed. Platelet morphology is normal.       Imaging    IR Chest Port Placement > 5 Yrs of Age    Result Date: 11/9/2021  [place] [facility] LOCATION: [facility] DATE: 11/9/2021 PROCEDURES: 1. SUBCUTANEOUS IMPLANTED VENOUS ACCESS PORT. 2. ULTRASOUND GUIDANCE FOR VASCULAR ACCESS. 3. FLUOROSCOPIC GUIDANCE FOR CATHETER PLACEMENT. 4. MODERATE SEDATION INTERVENTIONAL RADIOLOGIST: Helio Putnam MD INDICATION: Patient is 65-year-old female with a history of breast cancer who presents for right-sided Port-A-Cath placement The patient presents to Interventional Radiology for placement of a Port-A-Cath for long-term central venous access to allow for infusion and blood draws. CONSENT: The risks, benefits and  alternatives of Port placement were discussed with the patient  in detail. All questions were answered. Informed consent was given to proceed with the procedure. MODERATE SEDATION: Versed 2 mg IV; Fentanyl 50 mcg IV. During the time out, immediately prior to the administration of medications, the patient was reassessed for adequacy to receive conscious sedation.  Under physician supervision, Versed and fentanyl were administered for moderate sedation. Pulse oximetry, heart rate and blood pressure were continuously monitored by an independent trained observer. The physician spent 30 minutes of face-to-face sedation time with the patient. CONTRAST: None. ANTIBIOTICS: 2 g of IV Ancef. ADDITIONAL MEDICATIONS: None. FLUOROSCOPIC TIME: 1.4 minutes. RADIATION DOSE: Air Kerma: 65 mGy. COMPLICATIONS: No immediate complications. STERILE BARRIER TECHNIQUE: Maximum sterile barrier technique was used. Cutaneous antisepsis was performed at the operative site with application of 2% chlorhexidine and large sterile drape. Prior to the procedure, the  and assistant performed hand hygiene and wore hat, mask, sterile gown, and sterile gloves during the entire procedure. PROCEDURE: After discussing the procedure and risks, informed consent was obtained. Permanent ultrasound images were obtained of the right subclavian vein, documenting patency and compressibility. The right neck and upper chest were prepped and draped. After local anesthesia, the subclavian vein was punctured under direct ultrasound guidance, and a small dilator was placed. A short transverse skin incision was made below the clavicle, and a pocket  was created for the port. A skin tunnel was created from the pocket to the vein entry site, and the catheter was pulled through the tunnel. The vein was dilated and the catheter inserted through a peel-away sheath, positioning the tip fluoroscopically at the SVC/atriocaval junction. The catheter was cut to an  appropriate length. The catheter was then attached to the port itself and the port was placed within the subcutaneous pocket. The port was secured within the pocket utilizing two anchoring sutures. The port was flushed with heparin. The incision was closed with layered absorbable suture and covered with Dermabond. The patient tolerated the procedure, and there were no immediate complications. FINDINGS: Ultrasound shows an anechoic and compressible subclavian vein. A permanent ultrasound image of this was saved. After placement, fluoroscopic spot images show that the tip of the catheter is at the SVC/atriocaval junction.     IMPRESSION:  1.  Uneventful venous access port placement. This port is power injector compatible.    Echocardiogram Limited    Result Date: 2021  [id number]^[place] [address]  Name: ALBERTO CHEN MRN: [id number] : 1955 Study Date: 2021 09:06 AM Age: 65 yrs Gender: Female Patient Location: [facility] Reason For Study: Malignant neoplasm of central portion of right breast in female, Ordering Physician: SIRISHA MATTHEW Referring Physician: SIRISHA MATTHEW Performed By: ACE  BSA: 1.6 m2 Height: 66 in Weight: 124 lb HR: 113 BP: 145/94 mmHg ______________________________________________________________________________ Procedure Limited Echo Adult. ______________________________________________________________________________ Interpretation Summary  1. Limited echocardiographic study. 2. Normal left ventricular size and systolic performance with a visually estimated ejection fraction of 60-65%. 3. No significant valvular heart disease is identified on this study. 4. Normal right ventricular size and systolic performance. ______________________________________________________________________________ Left ventricle: Normal left ventricular size and systolic performance with a visually estimated ejection  "fraction of 60-65%. There is normal regional wall motion. Left ventricular wall thickness is normal.  Assessment of LV Diastolic Function: Examination was performed as a \"limited study\". Assessment of diastolic filling consequently not performed.  Right ventricle: Normal right ventricular size and systolic performance.  Left atrium: The left atrium is of normal size.  Right atrium: The right atrium is of normal size.  IVC: The IVC is of normal caliber.  Aortic valve: The aortic valve is not well visualized, but suspected to be comprised of three cusps. No significant aortic stenosis or aortic insufficiency is detected on this study.  Mitral valve: The mitral valve appears morphologically normal. There is trace mitral insufficiency.  Tricuspid valve: The tricuspid valve is grossly morphologically normal. There is trace tricuspid insufficiency.  Pulmonic valve: The pulmonic valve is grossly morphologically normal.  Thoracic aorta: The aortic root and proximal ascending aorta are of normal dimension.  Pericardium: There is no significant pericardial effusion. ______________________________________________________________________________ ______________________________________________________________________________ MMode/2D Measurements & Calculations IVSd: 0.60 cm LVIDd: 3.8 cm LVPWd: 0.70 cm LV mass(C)d: 65.3 grams LV mass(C)dI: 40.0 grams/m2 RWT: 0.37  Time Measurements MM HR: 105.0 BPM  Doppler Measurements & Calculations LV V1 max P.8 mmHg LV V1 max: 83.5 cm/sec LV V1 VTI: 11.7 cm TR max tank: 206.0 cm/sec TR max P.0 mmHg  ______________________________________________________________________________ Report approved by: Cynthia Waggoner 2021 12:24 PM           Signed by: VERENICE Frye CNP  "

## 2021-11-15 NOTE — LETTER
11/15/2021         RE: Jackie Urias  [address]  [place] [address]        Dear Colleague,    Thank you for referring your patient, Jackie Urias, to the [facility] [place]. Please see a copy of my visit note below.    [facility] Hematology and Oncology Progress Note    Patient: Jackie Urias  MRN: [id number]  Date of Service: 11/15/2021        Reason for Visit    Chief Complaint   Patient presents with     Oncology Clinic Visit     Malignant neoplasm of central portion of right breast in female, estrogen receptor positive (H)       Assessment and Plan    Cancer Staging  Malignant neoplasm of central portion of right breast in female, estrogen receptor positive (H)  Staging form: Breast, AJCC 8th Edition  - Pathologic stage from 10/5/2021: Stage IA (pT1c, pN0, cM0, G2, ER+, RI+, HER2+) - Signed by Gris Benjamin APRN CNP on 11/15/2021    1.  Breast cancer, right side, stage I: Patient is triple positive and is here today to start adjuvant chemotherapy with Taxol and Herceptin.  The plan is to do this weekly for 12 weeks.  Patient gave verbal consent.  She understands the side effects and they include but are not limited to: alopecia, diarrhea/constipation, nausea, vomiting, fatigue/weakness, myelosuppression, the pain, peripheral neuropathy, taste alterations, poor appetite. They understand this is with curable intent.  Patient will try to get her treatment up at [place] as much as she can.  She lives close to that facility.  Patient should be seen roughly every 3 weeks while she is on this treatment.  She will then be seen every 6 weeks when she is on her maintenance Herceptin.  She did have an echocardiogram done and that was normal.  That should be done every 3 to 4 months.    2.  Incidental finding of CLL on her lymph nodes: Her white blood cell count is elevated.  The majority is lymphocytes.  Her hemoglobin and platelets are normal.  Continue to monitor her  blood counts.    ECOG Performance    0 - Independent    Distress Screening (within last 30 days)    No data recorded     Pain  Pain Score: No Pain (0)    Problem List    Patient Active Problem List   Diagnosis     Breast cancer, stage 2 (H)     Fibromyalgia     Atopic dermatitis     Ductal carcinoma in situ (DCIS) of right breast     Family history of breast cancer in male     History of breast implant or prosthesis     History of left breast cancer     Malignant neoplasm of central portion of right breast in female, estrogen receptor positive (H)     CLL (chronic lymphocytic leukemia) (H)        ______________________________________________________________________________    History of Present Illness    Ms. Jackie Urias is a 65 year old postmenopausal woman who has been diagnosed with right-sided breast cancer located at 11 o'clock position presenting as focal asymmetry on the mammogram in August 2021.  This was on a routine mammogram screening timeframe.  She did not have any palpable lesion.  Diagnostic imaging revealed that she had a 3.6 cm mass at the position along with some other calcification.  She underwent biopsy which confirmed invasive ductal carcinoma ER positive CA positive HER-2 new positive by FISH.  She then also underwent biopsy of the right axillary lymph node which was negative for any metastatic carcinoma.     She underwent surgery in 5 October 2021 in the form of bilateral mastectomies with right sentinel lymph node biopsy and reconstruction bilaterally with tissue expander placement.  Pathology revealed invasive ductal carcinoma grade 2, 11 mm in size extensive DCIS no lymphovascular invasion.  Final stage I, T1c N0 M0.     Interestingly her lymph nodes did show some evidence of CLL, she is a new diagnosis for her as well.     She is a prior history of left-sided breast cancer diagnosed in 24 August 2019 when she was 52 years old.  This was also ER positive CA positive and HER-2/chapin 3+.   She had surgery in 3 September 2009 in the form of lumpectomy and sentinel lymph node biopsy.  1.2 cm tumor.  1 lymph node positive.  14 additional lymph nodes were negative.     Following that she was treated with dose dense Adriamycin and cyclophosphamide while followed by weekly paclitaxel and Herceptin.  She also got radiation therapy and tamoxifen for 5 years.     She also was found to have a DCIS in the right breast intermediate grade ER positive in September 2018.  She underwent right breast partial mastectomy on 19 October 2018.  This was a 3.2 cm DCIS with positive margin.  No invasive carcinoma was noted.  She had reexcision on 25 October 2018 which did show small amount of residual DCIS and the margins were still not clear.     She underwent reexcision on 8 January 2019 by Dr. Cox which was negative for DCIS or any invasive carcinoma.     She has undergone genetic testing and it is still pending.     She saw Dr. Powers in our clinic last week and he recommended that she do adjuvant chemotherapy due to the size of the tumor and that it is HER-2 positive.  She was agreeable to that so she is here today to start Taxol and Herceptin.  She will do this weekly for 12 weeks and then will go on to maintenance Herceptin to complete 1 year.  Patient does have a history of previously being on chemotherapy but states she did not have really much neuropathy but her biggest fears that she will have worsening of her fibromyalgia and body achiness and pain because that is what happened with her last chemotherapy.       Review of Systems    Pertinent items are noted in HPI.    Past History    Past Medical History:   Diagnosis Date     Cancer (H)      Fibromyalgia      GERD (gastroesophageal reflux disease)      Hernia, hiatal        PHYSICAL EXAM  /89   Pulse 101   Temp 98.4  F (36.9  C)   Resp 16   Wt 56.7 kg (125 lb)   SpO2 98%   BMI 20.18 kg/m      GENERAL: no acute distress. Cooperative in conversation.  Here alone. Mask on  RESP: Regular respiratory rate. No expiratory wheezes   MUSCULOSKELETAL: no bilateral leg swelling  NEURO: non focal. Alert and oriented x3.   PSYCH: within normal limits. No depression or anxiety.  SKIN: exposed skin is dry intact.     Lab Results    Recent Results (from the past 168 hour(s))   Hepatic panel   Result Value Ref Range    Bilirubin Total 0.5 0.0 - 1.0 mg/dL    Bilirubin Direct 0.2 <=0.5 mg/dL    Protein Total 6.6 6.0 - 8.0 g/dL    Albumin 3.9 3.5 - 5.0 g/dL    Alkaline Phosphatase 59 45 - 120 U/L    AST 14 0 - 40 U/L    ALT <9 0 - 45 U/L   CBC with platelets and differential   Result Value Ref Range    WBC Count 19.8 (H) 4.0 - 11.0 10e3/uL    RBC Count 3.96 3.80 - 5.20 10e6/uL    Hemoglobin 12.3 11.7 - 15.7 g/dL    Hematocrit 37.8 35.0 - 47.0 %    MCV 96 78 - 100 fL    MCH 31.1 26.5 - 33.0 pg    MCHC 32.5 31.5 - 36.5 g/dL    RDW 12.5 10.0 - 15.0 %    Platelet Count 269 150 - 450 10e3/uL   Manual Differential   Result Value Ref Range    % Neutrophils 20 %    % Lymphocytes 76 %    % Monocytes 1 %    % Eosinophils 3 %    % Basophils 0 %    Absolute Neutrophils 4.0 1.6 - 8.3 10e3/uL    Absolute Lymphocytes 15.0 (H) 0.8 - 5.3 10e3/uL    Absolute Monocytes 0.2 0.0 - 1.3 10e3/uL    Absolute Eosinophils 0.6 0.0 - 0.7 10e3/uL    Absolute Basophils 0.0 0.0 - 0.2 10e3/uL    RBC Morphology Confirmed RBC Indices     Platelet Assessment  Automated Count Confirmed. Platelet morphology is normal.     Automated Count Confirmed. Platelet morphology is normal.       Imaging    IR Chest Port Placement > 5 Yrs of Age    Result Date: 11/9/2021  [facility] LOCATION: [facility] DATE: 11/9/2021 PROCEDURES: 1. SUBCUTANEOUS IMPLANTED VENOUS ACCESS PORT. 2. ULTRASOUND GUIDANCE FOR VASCULAR ACCESS. 3. FLUOROSCOPIC GUIDANCE FOR CATHETER PLACEMENT. 4. MODERATE SEDATION INTERVENTIONAL RADIOLOGIST: Helio Putnam MD INDICATION: Patient is 65-year-old female with a history of breast  cancer who presents for right-sided Port-A-Cath placement The patient presents to Interventional Radiology for placement of a Port-A-Cath for long-term central venous access to allow for infusion and blood draws. CONSENT: The risks, benefits and alternatives of Port placement were discussed with the patient  in detail. All questions were answered. Informed consent was given to proceed with the procedure. MODERATE SEDATION: Versed 2 mg IV; Fentanyl 50 mcg IV. During the time out, immediately prior to the administration of medications, the patient was reassessed for adequacy to receive conscious sedation.  Under physician supervision, Versed and fentanyl were administered for moderate sedation. Pulse oximetry, heart rate and blood pressure were continuously monitored by an independent trained observer. The physician spent 30 minutes of face-to-face sedation time with the patient. CONTRAST: None. ANTIBIOTICS: 2 g of IV Ancef. ADDITIONAL MEDICATIONS: None. FLUOROSCOPIC TIME: 1.4 minutes. RADIATION DOSE: Air Kerma: 65 mGy. COMPLICATIONS: No immediate complications. STERILE BARRIER TECHNIQUE: Maximum sterile barrier technique was used. Cutaneous antisepsis was performed at the operative site with application of 2% chlorhexidine and large sterile drape. Prior to the procedure, the  and assistant performed hand hygiene and wore hat, mask, sterile gown, and sterile gloves during the entire procedure. PROCEDURE: After discussing the procedure and risks, informed consent was obtained. Permanent ultrasound images were obtained of the right subclavian vein, documenting patency and compressibility. The right neck and upper chest were prepped and draped. After local anesthesia, the subclavian vein was punctured under direct ultrasound guidance, and a small dilator was placed. A short transverse skin incision was made below the clavicle, and a pocket  was created for the port. A skin tunnel was created from the pocket to  the vein entry site, and the catheter was pulled through the tunnel. The vein was dilated and the catheter inserted through a peel-away sheath, positioning the tip fluoroscopically at the SVC/atriocaval junction. The catheter was cut to an appropriate length. The catheter was then attached to the port itself and the port was placed within the subcutaneous pocket. The port was secured within the pocket utilizing two anchoring sutures. The port was flushed with heparin. The incision was closed with layered absorbable suture and covered with Dermabond. The patient tolerated the procedure, and there were no immediate complications. FINDINGS: Ultrasound shows an anechoic and compressible subclavian vein. A permanent ultrasound image of this was saved. After placement, fluoroscopic spot images show that the tip of the catheter is at the SVC/atriocaval junction.     IMPRESSION:  1.  Uneventful venous access port placement. This port is power injector compatible.    Echocardiogram Limited    Result Date: 2021  [id number]^VIPUL^[place] [address]  Name: ALBERTO CHEN MRN: [id number] : 1955 Study Date: 2021 09:06 AM Age: 65 yrs Gender: Female Patient Location: [facility] Reason For Study: Malignant neoplasm of central portion of right breast in female, Ordering Physician: SIRISHA MATTHEW Referring Physician: SIRISHA MATTHEW Performed By: ACE  BSA: 1.6 m2 Height: 66 in Weight: 124 lb HR: 113 BP: 145/94 mmHg ______________________________________________________________________________ Procedure Limited Echo Adult. ______________________________________________________________________________ Interpretation Summary  1. Limited echocardiographic study. 2. Normal left ventricular size and systolic performance with a visually estimated ejection fraction of 60-65%. 3. No significant valvular heart disease is identified on this study. 4.  "Normal right ventricular size and systolic performance. ______________________________________________________________________________ Left ventricle: Normal left ventricular size and systolic performance with a visually estimated ejection fraction of 60-65%. There is normal regional wall motion. Left ventricular wall thickness is normal.  Assessment of LV Diastolic Function: Examination was performed as a \"limited study\". Assessment of diastolic filling consequently not performed.  Right ventricle: Normal right ventricular size and systolic performance.  Left atrium: The left atrium is of normal size.  Right atrium: The right atrium is of normal size.  IVC: The IVC is of normal caliber.  Aortic valve: The aortic valve is not well visualized, but suspected to be comprised of three cusps. No significant aortic stenosis or aortic insufficiency is detected on this study.  Mitral valve: The mitral valve appears morphologically normal. There is trace mitral insufficiency.  Tricuspid valve: The tricuspid valve is grossly morphologically normal. There is trace tricuspid insufficiency.  Pulmonic valve: The pulmonic valve is grossly morphologically normal.  Thoracic aorta: The aortic root and proximal ascending aorta are of normal dimension.  Pericardium: There is no significant pericardial effusion. ______________________________________________________________________________ ______________________________________________________________________________ MMode/2D Measurements & Calculations IVSd: 0.60 cm LVIDd: 3.8 cm LVPWd: 0.70 cm LV mass(C)d: 65.3 grams LV mass(C)dI: 40.0 grams/m2 RWT: 0.37  Time Measurements MM HR: 105.0 BPM  Doppler Measurements & Calculations LV V1 max P.8 mmHg LV V1 max: 83.5 cm/sec LV V1 VTI: 11.7 cm TR max tank: 206.0 cm/sec TR max P.0 mmHg  ______________________________________________________________________________ Report approved by: Cynthia Waggoner 2021 12:24 PM   " "        Signed by: VERENICE Frye CNP    Oncology Rooming Note    November 15, 2021 11:19 AM   Jackie Urias is a 65 year old female who presents for:    Chief Complaint   Patient presents with     Oncology Clinic Visit     Malignant neoplasm of central portion of right breast in female, estrogen receptor positive (H)     Initial Vitals: /89   Pulse 101   Temp 98.4  F (36.9  C)   Resp 16   Wt 56.7 kg (125 lb)   SpO2 98%   BMI 20.18 kg/m   Estimated body mass index is 20.18 kg/m  as calculated from the following:    Height as of 11/4/21: 1.676 m (5' 6\").    Weight as of this encounter: 56.7 kg (125 lb). Body surface area is 1.62 meters squared.  No Pain (0) Comment: Data Unavailable   No LMP recorded.  Allergies reviewed: Yes  Medications reviewed: Yes    Medications: Medication refills not needed today.  Pharmacy name entered into [facility]: [facility] - [place], [place] - [address]    Clinical concerns: None       Sangita Ashley                Again, thank you for allowing me to participate in the care of your patient.        Sincerely,        VERENICE Frye CNP    "

## 2021-11-15 NOTE — PROGRESS NOTES
"Oncology Rooming Note    November 15, 2021 11:19 AM   Jackie Urias is a 65 year old female who presents for:    Chief Complaint   Patient presents with     Oncology Clinic Visit     Malignant neoplasm of central portion of right breast in female, estrogen receptor positive (H)     Initial Vitals: /89   Pulse 101   Temp 98.4  F (36.9  C)   Resp 16   Wt 56.7 kg (125 lb)   SpO2 98%   BMI 20.18 kg/m   Estimated body mass index is 20.18 kg/m  as calculated from the following:    Height as of 11/4/21: 1.676 m (5' 6\").    Weight as of this encounter: 56.7 kg (125 lb). Body surface area is 1.62 meters squared.  No Pain (0) Comment: Data Unavailable   No LMP recorded.  Allergies reviewed: Yes  Medications reviewed: Yes    Medications: Medication refills not needed today.  Pharmacy name entered into [facility]: [facility] - [place], [place] - [address]    Clinical concerns: None       Sangita Ashley            "

## 2021-11-21 ENCOUNTER — HEALTH MAINTENANCE LETTER (OUTPATIENT)
Age: 66
End: 2021-11-21

## 2021-11-22 ENCOUNTER — INFUSION THERAPY VISIT (OUTPATIENT)
Dept: INFUSION THERAPY | Facility: HOSPITAL | Age: 66
End: 2021-11-22
Attending: INTERNAL MEDICINE
Payer: COMMERCIAL

## 2021-11-22 ENCOUNTER — ONCOLOGY VISIT (OUTPATIENT)
Dept: ONCOLOGY | Facility: HOSPITAL | Age: 66
End: 2021-11-22
Attending: NURSE PRACTITIONER
Payer: COMMERCIAL

## 2021-11-22 VITALS
BODY MASS INDEX: 20.04 KG/M2 | OXYGEN SATURATION: 96 % | RESPIRATION RATE: 20 BRPM | WEIGHT: 124.7 LBS | SYSTOLIC BLOOD PRESSURE: 154 MMHG | DIASTOLIC BLOOD PRESSURE: 86 MMHG | HEART RATE: 89 BPM | TEMPERATURE: 98.9 F | HEIGHT: 66 IN

## 2021-11-22 DIAGNOSIS — Z17.0 MALIGNANT NEOPLASM OF CENTRAL PORTION OF RIGHT BREAST IN FEMALE, ESTROGEN RECEPTOR POSITIVE (H): Primary | ICD-10-CM

## 2021-11-22 DIAGNOSIS — C50.111 MALIGNANT NEOPLASM OF CENTRAL PORTION OF RIGHT BREAST IN FEMALE, ESTROGEN RECEPTOR POSITIVE (H): Primary | ICD-10-CM

## 2021-11-22 LAB
BASOPHILS # BLD MANUAL: 0 10E3/UL (ref 0–0.2)
BASOPHILS NFR BLD MANUAL: 0 %
EOSINOPHIL # BLD MANUAL: 0.5 10E3/UL (ref 0–0.7)
EOSINOPHIL NFR BLD MANUAL: 2 %
ERYTHROCYTE [DISTWIDTH] IN BLOOD BY AUTOMATED COUNT: 12.5 % (ref 10–15)
HCT VFR BLD AUTO: 37.7 % (ref 35–47)
HGB BLD-MCNC: 12.2 G/DL (ref 11.7–15.7)
LYMPHOCYTES # BLD MANUAL: 21.3 10E3/UL (ref 0.8–5.3)
LYMPHOCYTES NFR BLD MANUAL: 85 %
MCH RBC QN AUTO: 30.8 PG (ref 26.5–33)
MCHC RBC AUTO-ENTMCNC: 32.4 G/DL (ref 31.5–36.5)
MCV RBC AUTO: 95 FL (ref 78–100)
MONOCYTES # BLD MANUAL: 0 10E3/UL (ref 0–1.3)
MONOCYTES NFR BLD MANUAL: 0 %
NEUTROPHILS # BLD MANUAL: 3.3 10E3/UL (ref 1.6–8.3)
NEUTROPHILS NFR BLD MANUAL: 13 %
PLAT MORPH BLD: ABNORMAL
PLATELET # BLD AUTO: 302 10E3/UL (ref 150–450)
RBC # BLD AUTO: 3.96 10E6/UL (ref 3.8–5.2)
RBC MORPH BLD: ABNORMAL
SMUDGE CELLS BLD QL SMEAR: PRESENT
WBC # BLD AUTO: 25 10E3/UL (ref 4–11)

## 2021-11-22 PROCEDURE — 96375 TX/PRO/DX INJ NEW DRUG ADDON: CPT

## 2021-11-22 PROCEDURE — 99214 OFFICE O/P EST MOD 30 MIN: CPT | Performed by: NURSE PRACTITIONER

## 2021-11-22 PROCEDURE — 250N000009 HC RX 250: Performed by: INTERNAL MEDICINE

## 2021-11-22 PROCEDURE — 96413 CHEMO IV INFUSION 1 HR: CPT

## 2021-11-22 PROCEDURE — 250N000011 HC RX IP 250 OP 636: Performed by: INTERNAL MEDICINE

## 2021-11-22 PROCEDURE — 250N000011 HC RX IP 250 OP 636: Performed by: NURSE PRACTITIONER

## 2021-11-22 PROCEDURE — 85041 AUTOMATED RBC COUNT: CPT | Performed by: INTERNAL MEDICINE

## 2021-11-22 PROCEDURE — 258N000003 HC RX IP 258 OP 636: Performed by: INTERNAL MEDICINE

## 2021-11-22 PROCEDURE — G0463 HOSPITAL OUTPT CLINIC VISIT: HCPCS | Mod: 25

## 2021-11-22 PROCEDURE — 258N000003 HC RX IP 258 OP 636: Performed by: NURSE PRACTITIONER

## 2021-11-22 PROCEDURE — 96367 TX/PROPH/DG ADDL SEQ IV INF: CPT

## 2021-11-22 RX ORDER — PACLITAXEL 100 MG/20ML
100 INJECTION, POWDER, LYOPHILIZED, FOR SUSPENSION INTRAVENOUS ONCE
Status: CANCELLED
Start: 2021-11-29 | End: 2021-11-29

## 2021-11-22 RX ORDER — PACLITAXEL 100 MG/20ML
100 INJECTION, POWDER, LYOPHILIZED, FOR SUSPENSION INTRAVENOUS ONCE
Status: COMPLETED | OUTPATIENT
Start: 2021-11-22 | End: 2021-11-22

## 2021-11-22 RX ORDER — PACLITAXEL 100 MG/20ML
40 INJECTION, POWDER, LYOPHILIZED, FOR SUSPENSION INTRAVENOUS ONCE
Status: DISCONTINUED | OUTPATIENT
Start: 2021-11-22 | End: 2021-11-22

## 2021-11-22 RX ORDER — PACLITAXEL 100 MG/20ML
40 INJECTION, POWDER, LYOPHILIZED, FOR SUSPENSION INTRAVENOUS ONCE
Status: CANCELLED
Start: 2021-11-22 | End: 2021-11-22

## 2021-11-22 RX ORDER — HEPARIN SODIUM (PORCINE) LOCK FLUSH IV SOLN 100 UNIT/ML 100 UNIT/ML
5 SOLUTION INTRAVENOUS
Status: DISCONTINUED | OUTPATIENT
Start: 2021-11-22 | End: 2021-11-22 | Stop reason: HOSPADM

## 2021-11-22 RX ADMIN — PACLITAXEL 160 MG: 100 INJECTION, POWDER, LYOPHILIZED, FOR SUSPENSION INTRAVENOUS at 12:51

## 2021-11-22 RX ADMIN — SODIUM CHLORIDE 112 MG: 9 INJECTION, SOLUTION INTRAVENOUS at 12:07

## 2021-11-22 RX ADMIN — HEPARIN SODIUM (PORCINE) LOCK FLUSH IV SOLN 100 UNIT/ML 5 ML: 100 SOLUTION at 13:24

## 2021-11-22 RX ADMIN — FAMOTIDINE 20 MG: 10 INJECTION, SOLUTION INTRAVENOUS at 12:04

## 2021-11-22 RX ADMIN — DEXAMETHASONE SODIUM PHOSPHATE 20 MG: 10 INJECTION, SOLUTION INTRAMUSCULAR; INTRAVENOUS at 11:37

## 2021-11-22 RX ADMIN — SODIUM CHLORIDE 250 ML: 9 INJECTION, SOLUTION INTRAVENOUS at 11:12

## 2021-11-22 RX ADMIN — DIPHENHYDRAMINE HYDROCHLORIDE 25 MG: 50 INJECTION, SOLUTION INTRAMUSCULAR; INTRAVENOUS at 11:10

## 2021-11-22 ASSESSMENT — PAIN SCALES - GENERAL: PAINLEVEL: NO PAIN (0)

## 2021-11-22 ASSESSMENT — MIFFLIN-ST. JEOR: SCORE: 1137.14

## 2021-11-22 NOTE — PROGRESS NOTES
"Oncology Rooming Note    November 22, 2021 9:25 AM   Jackie Urias is a 65 year old female who presents for:    Chief Complaint   Patient presents with     Oncology Clinic Visit     1 week return Malignant neoplasm of central portion of right breast in female, estrogen receptor positive, Labs / NP / Infusion today     Initial Vitals: BP (!) 154/86 (BP Location: Right arm, Patient Position: Sitting, Cuff Size: Adult Regular)   Pulse 89   Temp 98.9  F (37.2  C)   Resp 20   Ht 1.676 m (5' 5.98\")   Wt 56.6 kg (124 lb 11.2 oz)   SpO2 96%   BMI 20.14 kg/m   Estimated body mass index is 20.14 kg/m  as calculated from the following:    Height as of this encounter: 1.676 m (5' 5.98\").    Weight as of this encounter: 56.6 kg (124 lb 11.2 oz). Body surface area is 1.62 meters squared.  No Pain (0) Comment: Data Unavailable   No LMP recorded.  Allergies reviewed: Yes  Medications reviewed: Yes    Medications: Medication refills not needed today.  Pharmacy name entered into [facility]: [id number] - [place], [place] - [address]    Clinical concerns  1 week return Malignant neoplasm of central portion of right breast in female, estrogen receptor positive, Labs / NP / Infusion today.   Check Right forearm bruise and inflammation.   Why chemo is needed.       Erika Hilton, [facility]              "

## 2021-11-22 NOTE — PROGRESS NOTES
Pt here to see NP and treatment change. Pt had a severe reaction to taxol last week. Today pt received abraxane without incident. Port flushed and deaccessed upon completion and pt d.c ambulatory to lobby to meet her ride. Pt has treatment next week at [facility]. Pt aware of treatment plan and schedule.

## 2021-11-22 NOTE — PROGRESS NOTES
[facility] Hematology and Oncology Progress Note    Patient: Jackie Urias  MRN: [id number]  Date of Service: Nov 22, 2021          Reason for Visit    Chief Complaint   Patient presents with     Oncology Clinic Visit     1 week return Malignant neoplasm of central portion of right breast in female, estrogen receptor positive, Labs / NP / Infusion today       Assessment and Plan    Cancer Staging  Malignant neoplasm of central portion of right breast in female, estrogen receptor positive (H)  Staging form: Breast, AJCC 8th Edition  - Pathologic stage from 10/5/2021: Stage IA (pT1c, pN0, cM0, G2, ER+, VT+, HER2+) - Signed by Gris Benjamin APRN CNP on 11/15/2021    1.  Breast cancer, right side, stage I: Patient is triple positive and darted adjuvant chemo last week with Herceptin and Taxol.  She had a very severe reaction to Taxol weekly quickly into the infusion and we stopped it and gave extra premeds.  She felt horrible for about 24 hours.  I do not feel that it is safe to continue on Taxol so we will give weekly Abraxane instead of Taxol.  Has a lower incidence of allergic reaction.  This will be given 40 mg per metered squared.  So she will need a total of 11 weeks of this along with Herceptin.  Discussed expected side effects.   Patient will try to get her treatment up at [place] as much as she can.  She lives close to that facility.  Patient should be seen roughly every 3 weeks while she is on this treatment.  She will then be seen every 6 weeks when she is on her maintenance Herceptin.  She did have an echocardiogram done and that was normal.  That should be done every 3 to 4 months.    2.  Incidental finding of CLL on her lymph nodes: Her white blood cell count is elevated.  The majority is lymphocytes.  Her hemoglobin and platelets are normal.  Continue to monitor her blood counts.    ECOG Performance    0 - Independent    Distress Screening (within last 30 days)    No data recorded      Pain  Pain Score: No Pain (0)    Problem List    Patient Active Problem List   Diagnosis     Breast cancer, stage 2 (H)     Fibromyalgia     Atopic dermatitis     Ductal carcinoma in situ (DCIS) of right breast     Family history of breast cancer in male     History of breast implant or prosthesis     History of left breast cancer     Malignant neoplasm of central portion of right breast in female, estrogen receptor positive (H)     CLL (chronic lymphocytic leukemia) (H)        ______________________________________________________________________________    History of Present Illness    Ms. Jackie Urias is a 65 year old postmenopausal woman who has been diagnosed with right-sided breast cancer located at 11 o'clock position presenting as focal asymmetry on the mammogram in August 2021.  This was on a routine mammogram screening timeframe.  She did not have any palpable lesion.  Diagnostic imaging revealed that she had a 3.6 cm mass at the position along with some other calcification.  She underwent biopsy which confirmed invasive ductal carcinoma ER positive MS positive HER-2 new positive by FISH.  She then also underwent biopsy of the right axillary lymph node which was negative for any metastatic carcinoma.     She underwent surgery in 5 October 2021 in the form of bilateral mastectomies with right sentinel lymph node biopsy and reconstruction bilaterally with tissue expander placement.  Pathology revealed invasive ductal carcinoma grade 2, 11 mm in size extensive DCIS no lymphovascular invasion.  Final stage I, T1c N0 M0.     Interestingly her lymph nodes did show some evidence of CLL, she is a new diagnosis for her as well.     She is a prior history of left-sided breast cancer diagnosed in 24 August 2019 when she was 52 years old.  This was also ER positive MS positive and HER-2/chapin 3+.  She had surgery in 3 September 2009 in the form of lumpectomy and sentinel lymph node biopsy.  1.2 cm tumor.  1 lymph  "node positive.  14 additional lymph nodes were negative.     Following that she was treated with dose dense Adriamycin and cyclophosphamide while followed by weekly paclitaxel and Herceptin.  She also got radiation therapy and tamoxifen for 5 years.     She also was found to have a DCIS in the right breast intermediate grade ER positive in September 2018.  She underwent right breast partial mastectomy on 19 October 2018.  This was a 3.2 cm DCIS with positive margin.  No invasive carcinoma was noted.  She had reexcision on 25 October 2018 which did show small amount of residual DCIS and the margins were still not clear.     She underwent reexcision on 8 January 2019 by Dr. Cox which was negative for DCIS or any invasive carcinoma.     She has undergone genetic testing and it is still pending.     She saw Dr. Powers in our clinic and he recommended that she do adjuvant chemotherapy due to the size of the tumor and that it is HER-2 positive.  She was agreeable to that so she started Taxol and Herceptin last week.  She was reluctant to do treatment but thought it was best to do it.  She had a pretty severe reaction to the Taxol after only getting about 25 cc.  She had breathing issues.  She was turning red.  She was having chest heaviness and tightness.  Patient and I did not feel it was safe trying to rechallenge.  She is here today now to decide what to do.  She is not sure that she wants to continue on chemotherapy but states that if there is a less likely chance of reaction she would try to do chemo again.    Review of Systems    Pertinent items are noted in HPI.    Past History    Past Medical History:   Diagnosis Date     Cancer (H)      Fibromyalgia      GERD (gastroesophageal reflux disease)      Hernia, hiatal        PHYSICAL EXAM  BP (!) 154/86 (BP Location: Right arm, Patient Position: Sitting, Cuff Size: Adult Regular)   Pulse 89   Temp 98.9  F (37.2  C)   Resp 20   Ht 1.676 m (5' 5.98\")   Wt 56.6 kg " (124 lb 11.2 oz)   SpO2 96%   BMI 20.14 kg/m      GENERAL: no acute distress. Cooperative in conversation. Here friend. Masks on  RESP: Regular respiratory rate. No expiratory wheezes   MUSCULOSKELETAL: no bilateral leg swelling  NEURO: non focal. Alert and oriented x3.   PSYCH: within normal limits. No depression or anxiety.  SKIN: exposed skin is dry intact.     Lab Results    Recent Results (from the past 168 hour(s))   Hepatic panel   Result Value Ref Range    Bilirubin Total 0.5 0.0 - 1.0 mg/dL    Bilirubin Direct 0.2 <=0.5 mg/dL    Protein Total 6.6 6.0 - 8.0 g/dL    Albumin 3.9 3.5 - 5.0 g/dL    Alkaline Phosphatase 59 45 - 120 U/L    AST 14 0 - 40 U/L    ALT <9 0 - 45 U/L   CBC with platelets and differential   Result Value Ref Range    WBC Count 19.8 (H) 4.0 - 11.0 10e3/uL    RBC Count 3.96 3.80 - 5.20 10e6/uL    Hemoglobin 12.3 11.7 - 15.7 g/dL    Hematocrit 37.8 35.0 - 47.0 %    MCV 96 78 - 100 fL    MCH 31.1 26.5 - 33.0 pg    MCHC 32.5 31.5 - 36.5 g/dL    RDW 12.5 10.0 - 15.0 %    Platelet Count 269 150 - 450 10e3/uL   Manual Differential   Result Value Ref Range    % Neutrophils 20 %    % Lymphocytes 76 %    % Monocytes 1 %    % Eosinophils 3 %    % Basophils 0 %    Absolute Neutrophils 4.0 1.6 - 8.3 10e3/uL    Absolute Lymphocytes 15.0 (H) 0.8 - 5.3 10e3/uL    Absolute Monocytes 0.2 0.0 - 1.3 10e3/uL    Absolute Eosinophils 0.6 0.0 - 0.7 10e3/uL    Absolute Basophils 0.0 0.0 - 0.2 10e3/uL    RBC Morphology Confirmed RBC Indices     Platelet Assessment  Automated Count Confirmed. Platelet morphology is normal.     Automated Count Confirmed. Platelet morphology is normal.   CBC with platelets and differential   Result Value Ref Range    WBC Count 25.0 (H) 4.0 - 11.0 10e3/uL    RBC Count 3.96 3.80 - 5.20 10e6/uL    Hemoglobin 12.2 11.7 - 15.7 g/dL    Hematocrit 37.7 35.0 - 47.0 %    MCV 95 78 - 100 fL    MCH 30.8 26.5 - 33.0 pg    MCHC 32.4 31.5 - 36.5 g/dL    RDW 12.5 10.0 - 15.0 %    Platelet Count  302 150 - 450 10e3/uL       Imaging    IR Chest Port Placement > 5 Yrs of Age    Result Date: 11/9/2021  [place] [facility] LOCATION: [facility] DATE: 11/9/2021 PROCEDURES: 1. SUBCUTANEOUS IMPLANTED VENOUS ACCESS PORT. 2. ULTRASOUND GUIDANCE FOR VASCULAR ACCESS. 3. FLUOROSCOPIC GUIDANCE FOR CATHETER PLACEMENT. 4. MODERATE SEDATION INTERVENTIONAL RADIOLOGIST: Helio Putnam MD INDICATION: Patient is 65-year-old female with a history of breast cancer who presents for right-sided Port-A-Cath placement The patient presents to Interventional Radiology for placement of a Port-A-Cath for long-term central venous access to allow for infusion and blood draws. CONSENT: The risks, benefits and alternatives of Port placement were discussed with the patient  in detail. All questions were answered. Informed consent was given to proceed with the procedure. MODERATE SEDATION: Versed 2 mg IV; Fentanyl 50 mcg IV. During the time out, immediately prior to the administration of medications, the patient was reassessed for adequacy to receive conscious sedation.  Under physician supervision, Versed and fentanyl were administered for moderate sedation. Pulse oximetry, heart rate and blood pressure were continuously monitored by an independent trained observer. The physician spent 30 minutes of face-to-face sedation time with the patient. CONTRAST: None. ANTIBIOTICS: 2 g of IV Ancef. ADDITIONAL MEDICATIONS: None. FLUOROSCOPIC TIME: 1.4 minutes. RADIATION DOSE: Air Kerma: 65 mGy. COMPLICATIONS: No immediate complications. STERILE BARRIER TECHNIQUE: Maximum sterile barrier technique was used. Cutaneous antisepsis was performed at the operative site with application of 2% chlorhexidine and large sterile drape. Prior to the procedure, the  and assistant performed hand hygiene and wore hat, mask, sterile gown, and sterile gloves during the entire procedure. PROCEDURE: After discussing the procedure and risks,  informed consent was obtained. Permanent ultrasound images were obtained of the right subclavian vein, documenting patency and compressibility. The right neck and upper chest were prepped and draped. After local anesthesia, the subclavian vein was punctured under direct ultrasound guidance, and a small dilator was placed. A short transverse skin incision was made below the clavicle, and a pocket  was created for the port. A skin tunnel was created from the pocket to the vein entry site, and the catheter was pulled through the tunnel. The vein was dilated and the catheter inserted through a peel-away sheath, positioning the tip fluoroscopically at the SVC/atriocaval junction. The catheter was cut to an appropriate length. The catheter was then attached to the port itself and the port was placed within the subcutaneous pocket. The port was secured within the pocket utilizing two anchoring sutures. The port was flushed with heparin. The incision was closed with layered absorbable suture and covered with Dermabond. The patient tolerated the procedure, and there were no immediate complications. FINDINGS: Ultrasound shows an anechoic and compressible subclavian vein. A permanent ultrasound image of this was saved. After placement, fluoroscopic spot images show that the tip of the catheter is at the SVC/atriocaval junction.     IMPRESSION:  1.  Uneventful venous access port placement. This port is power injector compatible.    Echocardiogram Limited    Result Date: 2021  [id number]^[id number]^[place] [address]  Name: ALBERTO CHEN MRN: [id number] : 1955 Study Date: 2021 09:06 AM Age: 65 yrs Gender: Female Patient [facility]: [facility] Reason For Study: Malignant neoplasm of central portion of right breast in female, Ordering Physician: SIRISHA MATTHEW Referring Physician: SIRISHA MATTHEW Performed By: ACE  BSA: 1.6 m2 Height: 66 in  "Weight: 124 lb HR: 113 BP: 145/94 mmHg ______________________________________________________________________________ Procedure Limited Echo Adult. ______________________________________________________________________________ Interpretation Summary  1. Limited echocardiographic study. 2. Normal left ventricular size and systolic performance with a visually estimated ejection fraction of 60-65%. 3. No significant valvular heart disease is identified on this study. 4. Normal right ventricular size and systolic performance. ______________________________________________________________________________ Left ventricle: Normal left ventricular size and systolic performance with a visually estimated ejection fraction of 60-65%. There is normal regional wall motion. Left ventricular wall thickness is normal.  Assessment of LV Diastolic Function: Examination was performed as a \"limited study\". Assessment of diastolic filling consequently not performed.  Right ventricle: Normal right ventricular size and systolic performance.  Left atrium: The left atrium is of normal size.  Right atrium: The right atrium is of normal size.  IVC: The IVC is of normal caliber.  Aortic valve: The aortic valve is not well visualized, but suspected to be comprised of three cusps. No significant aortic stenosis or aortic insufficiency is detected on this study.  Mitral valve: The mitral valve appears morphologically normal. There is trace mitral insufficiency.  Tricuspid valve: The tricuspid valve is grossly morphologically normal. There is trace tricuspid insufficiency.  Pulmonic valve: The pulmonic valve is grossly morphologically normal.  Thoracic aorta: The aortic root and proximal ascending aorta are of normal dimension.  Pericardium: There is no significant pericardial effusion. ______________________________________________________________________________ ______________________________________________________________________________ " MMode/2D Measurements & Calculations IVSd: 0.60 cm LVIDd: 3.8 cm LVPWd: 0.70 cm LV mass(C)d: 65.3 grams LV mass(C)dI: 40.0 grams/m2 RWT: 0.37  Time Measurements MM HR: 105.0 BPM  Doppler Measurements & Calculations LV V1 max P.8 mmHg LV V1 max: 83.5 cm/sec LV V1 VTI: 11.7 cm TR max tank: 206.0 cm/sec TR max P.0 mmHg  ______________________________________________________________________________ Report approved by: Cynthia Waggoner 2021 12:24 PM           Signed by: VERENICE Frye CNP

## 2021-11-22 NOTE — LETTER
11/22/2021         RE: Jackie Urias  [address]  [place] [address]        Dear Colleague,    Thank you for referring your patient, Jackie Urias, to the [facility] [place]. Please see a copy of my visit note below.    [facility] Hematology and Oncology Progress Note    Patient: Jackie Urias  MRN: [id number]  Date of Service: Nov 22, 2021          Reason for Visit    Chief Complaint   Patient presents with     Oncology Clinic Visit     1 week return Malignant neoplasm of central portion of right breast in female, estrogen receptor positive, Labs / NP / Infusion today       Assessment and Plan    Cancer Staging  Malignant neoplasm of central portion of right breast in female, estrogen receptor positive (H)  Staging form: Breast, AJCC 8th Edition  - Pathologic stage from 10/5/2021: Stage IA (pT1c, pN0, cM0, G2, ER+, MD+, HER2+) - Signed by Gris Benjamin APRN CNP on 11/15/2021    1.  Breast cancer, right side, stage I: Patient is triple positive and darted adjuvant chemo last week with Herceptin and Taxol.  She had a very severe reaction to Taxol weekly quickly into the infusion and we stopped it and gave extra premeds.  She felt horrible for about 24 hours.  I do not feel that it is safe to continue on Taxol so we will give weekly Abraxane instead of Taxol.  Has a lower incidence of allergic reaction.  This will be given 40 mg per metered squared.  So she will need a total of 11 weeks of this along with Herceptin.  Discussed expected side effects.   Patient will try to get her treatment up at [place] as much as she can.  She lives close to that facility.  Patient should be seen roughly every 3 weeks while she is on this treatment.  She will then be seen every 6 weeks when she is on her maintenance Herceptin.  She did have an echocardiogram done and that was normal.  That should be done every 3 to 4 months.    2.  Incidental finding of CLL on her lymph nodes: Her  white blood cell count is elevated.  The majority is lymphocytes.  Her hemoglobin and platelets are normal.  Continue to monitor her blood counts.    ECOG Performance    0 - Independent    Distress Screening (within last 30 days)    No data recorded     Pain  Pain Score: No Pain (0)    Problem List    Patient Active Problem List   Diagnosis     Breast cancer, stage 2 (H)     Fibromyalgia     Atopic dermatitis     Ductal carcinoma in situ (DCIS) of right breast     Family history of breast cancer in male     History of breast implant or prosthesis     History of left breast cancer     Malignant neoplasm of central portion of right breast in female, estrogen receptor positive (H)     CLL (chronic lymphocytic leukemia) (H)        ______________________________________________________________________________    History of Present Illness    Ms. Jackie Urias is a 65 year old postmenopausal woman who has been diagnosed with right-sided breast cancer located at 11 o'clock position presenting as focal asymmetry on the mammogram in August 2021.  This was on a routine mammogram screening timeframe.  She did not have any palpable lesion.  Diagnostic imaging revealed that she had a 3.6 cm mass at the position along with some other calcification.  She underwent biopsy which confirmed invasive ductal carcinoma ER positive MI positive HER-2 new positive by FISH.  She then also underwent biopsy of the right axillary lymph node which was negative for any metastatic carcinoma.     She underwent surgery in 5 October 2021 in the form of bilateral mastectomies with right sentinel lymph node biopsy and reconstruction bilaterally with tissue expander placement.  Pathology revealed invasive ductal carcinoma grade 2, 11 mm in size extensive DCIS no lymphovascular invasion.  Final stage I, T1c N0 M0.     Interestingly her lymph nodes did show some evidence of CLL, she is a new diagnosis for her as well.     She is a prior history of  left-sided breast cancer diagnosed in 24 August 2019 when she was 52 years old.  This was also ER positive TX positive and HER-2/chapin 3+.  She had surgery in 3 September 2009 in the form of lumpectomy and sentinel lymph node biopsy.  1.2 cm tumor.  1 lymph node positive.  14 additional lymph nodes were negative.     Following that she was treated with dose dense Adriamycin and cyclophosphamide while followed by weekly paclitaxel and Herceptin.  She also got radiation therapy and tamoxifen for 5 years.     She also was found to have a DCIS in the right breast intermediate grade ER positive in September 2018.  She underwent right breast partial mastectomy on 19 October 2018.  This was a 3.2 cm DCIS with positive margin.  No invasive carcinoma was noted.  She had reexcision on 25 October 2018 which did show small amount of residual DCIS and the margins were still not clear.     She underwent reexcision on 8 January 2019 by Dr. Cox which was negative for DCIS or any invasive carcinoma.     She has undergone genetic testing and it is still pending.     She saw Dr. Powers in our clinic and he recommended that she do adjuvant chemotherapy due to the size of the tumor and that it is HER-2 positive.  She was agreeable to that so she started Taxol and Herceptin last week.  She was reluctant to do treatment but thought it was best to do it.  She had a pretty severe reaction to the Taxol after only getting about 25 cc.  She had breathing issues.  She was turning red.  She was having chest heaviness and tightness.  Patient and I did not feel it was safe trying to rechallenge.  She is here today now to decide what to do.  She is not sure that she wants to continue on chemotherapy but states that if there is a less likely chance of reaction she would try to do chemo again.    Review of Systems    Pertinent items are noted in HPI.    Past History    Past Medical History:   Diagnosis Date     Cancer (H)      Fibromyalgia      GERD  "(gastroesophageal reflux disease)      Hernia, hiatal        PHYSICAL EXAM  BP (!) 154/86 (BP Location: Right arm, Patient Position: Sitting, Cuff Size: Adult Regular)   Pulse 89   Temp 98.9  F (37.2  C)   Resp 20   Ht 1.676 m (5' 5.98\")   Wt 56.6 kg (124 lb 11.2 oz)   SpO2 96%   BMI 20.14 kg/m      GENERAL: no acute distress. Cooperative in conversation. Here friend. Masks on  RESP: Regular respiratory rate. No expiratory wheezes   MUSCULOSKELETAL: no bilateral leg swelling  NEURO: non focal. Alert and oriented x3.   PSYCH: within normal limits. No depression or anxiety.  SKIN: exposed skin is dry intact.     Lab Results    Recent Results (from the past 168 hour(s))   Hepatic panel   Result Value Ref Range    Bilirubin Total 0.5 0.0 - 1.0 mg/dL    Bilirubin Direct 0.2 <=0.5 mg/dL    Protein Total 6.6 6.0 - 8.0 g/dL    Albumin 3.9 3.5 - 5.0 g/dL    Alkaline Phosphatase 59 45 - 120 U/L    AST 14 0 - 40 U/L    ALT <9 0 - 45 U/L   CBC with platelets and differential   Result Value Ref Range    WBC Count 19.8 (H) 4.0 - 11.0 10e3/uL    RBC Count 3.96 3.80 - 5.20 10e6/uL    Hemoglobin 12.3 11.7 - 15.7 g/dL    Hematocrit 37.8 35.0 - 47.0 %    MCV 96 78 - 100 fL    MCH 31.1 26.5 - 33.0 pg    MCHC 32.5 31.5 - 36.5 g/dL    RDW 12.5 10.0 - 15.0 %    Platelet Count 269 150 - 450 10e3/uL   Manual Differential   Result Value Ref Range    % Neutrophils 20 %    % Lymphocytes 76 %    % Monocytes 1 %    % Eosinophils 3 %    % Basophils 0 %    Absolute Neutrophils 4.0 1.6 - 8.3 10e3/uL    Absolute Lymphocytes 15.0 (H) 0.8 - 5.3 10e3/uL    Absolute Monocytes 0.2 0.0 - 1.3 10e3/uL    Absolute Eosinophils 0.6 0.0 - 0.7 10e3/uL    Absolute Basophils 0.0 0.0 - 0.2 10e3/uL    RBC Morphology Confirmed RBC Indices     Platelet Assessment  Automated Count Confirmed. Platelet morphology is normal.     Automated Count Confirmed. Platelet morphology is normal.   CBC with platelets and differential   Result Value Ref Range    WBC Count 25.0 " (H) 4.0 - 11.0 10e3/uL    RBC Count 3.96 3.80 - 5.20 10e6/uL    Hemoglobin 12.2 11.7 - 15.7 g/dL    Hematocrit 37.7 35.0 - 47.0 %    MCV 95 78 - 100 fL    MCH 30.8 26.5 - 33.0 pg    MCHC 32.4 31.5 - 36.5 g/dL    RDW 12.5 10.0 - 15.0 %    Platelet Count 302 150 - 450 10e3/uL       Imaging    IR Chest Port Placement > 5 Yrs of Age    Result Date: 11/9/2021  [place] [facility] LOCATION: [facility] DATE: 11/9/2021 PROCEDURES: 1. SUBCUTANEOUS IMPLANTED VENOUS ACCESS PORT. 2. ULTRASOUND GUIDANCE FOR VASCULAR ACCESS. 3. FLUOROSCOPIC GUIDANCE FOR CATHETER PLACEMENT. 4. MODERATE SEDATION INTERVENTIONAL RADIOLOGIST: Helio Putnam MD INDICATION: Patient is 65-year-old female with a history of breast cancer who presents for right-sided Port-A-Cath placement The patient presents to Interventional Radiology for placement of a Port-A-Cath for long-term central venous access to allow for infusion and blood draws. CONSENT: The risks, benefits and alternatives of Port placement were discussed with the patient  in detail. All questions were answered. Informed consent was given to proceed with the procedure. MODERATE SEDATION: Versed 2 mg IV; Fentanyl 50 mcg IV. During the time out, immediately prior to the administration of medications, the patient was reassessed for adequacy to receive conscious sedation.  Under physician supervision, Versed and fentanyl were administered for moderate sedation. Pulse oximetry, heart rate and blood pressure were continuously monitored by an independent trained observer. The physician spent 30 minutes of face-to-face sedation time with the patient. CONTRAST: None. ANTIBIOTICS: 2 g of IV Ancef. ADDITIONAL MEDICATIONS: None. FLUOROSCOPIC TIME: 1.4 minutes. RADIATION DOSE: Air Kerma: 65 mGy. COMPLICATIONS: No immediate complications. STERILE BARRIER TECHNIQUE: Maximum sterile barrier technique was used. Cutaneous antisepsis was performed at the operative site with application of  2% chlorhexidine and large sterile drape. Prior to the procedure, the  and assistant performed hand hygiene and wore hat, mask, sterile gown, and sterile gloves during the entire procedure. PROCEDURE: After discussing the procedure and risks, informed consent was obtained. Permanent ultrasound images were obtained of the right subclavian vein, documenting patency and compressibility. The right neck and upper chest were prepped and draped. After local anesthesia, the subclavian vein was punctured under direct ultrasound guidance, and a small dilator was placed. A short transverse skin incision was made below the clavicle, and a pocket  was created for the port. A skin tunnel was created from the pocket to the vein entry site, and the catheter was pulled through the tunnel. The vein was dilated and the catheter inserted through a peel-away sheath, positioning the tip fluoroscopically at the SVC/atriocaval junction. The catheter was cut to an appropriate length. The catheter was then attached to the port itself and the port was placed within the subcutaneous pocket. The port was secured within the pocket utilizing two anchoring sutures. The port was flushed with heparin. The incision was closed with layered absorbable suture and covered with Dermabond. The patient tolerated the procedure, and there were no immediate complications. FINDINGS: Ultrasound shows an anechoic and compressible subclavian vein. A permanent ultrasound image of this was saved. After placement, fluoroscopic spot images show that the tip of the catheter is at the SVC/atriocaval junction.     IMPRESSION:  1.  Uneventful venous access port placement. This port is power injector compatible.    Echocardiogram Limited    Result Date: 2021  [id number]^VIPUL^[place] [address]  Name: ALBERTO CHEN MRN: [id number] : 1955 Study Date: 2021 09:06 AM Age:  "65 yrs Gender: Female Patient Location: [facility] Reason For Study: Malignant neoplasm of central portion of right breast in female, Ordering Physician: SIRISHA MATTHEW Referring Physician: SIRISHA MATTHEW Performed By: ACE  BSA: 1.6 m2 Height: 66 in Weight: 124 lb HR: 113 BP: 145/94 mmHg ______________________________________________________________________________ Procedure Limited Echo Adult. ______________________________________________________________________________ Interpretation Summary  1. Limited echocardiographic study. 2. Normal left ventricular size and systolic performance with a visually estimated ejection fraction of 60-65%. 3. No significant valvular heart disease is identified on this study. 4. Normal right ventricular size and systolic performance. ______________________________________________________________________________ Left ventricle: Normal left ventricular size and systolic performance with a visually estimated ejection fraction of 60-65%. There is normal regional wall motion. Left ventricular wall thickness is normal.  Assessment of LV Diastolic Function: Examination was performed as a \"limited study\". Assessment of diastolic filling consequently not performed.  Right ventricle: Normal right ventricular size and systolic performance.  Left atrium: The left atrium is of normal size.  Right atrium: The right atrium is of normal size.  IVC: The IVC is of normal caliber.  Aortic valve: The aortic valve is not well visualized, but suspected to be comprised of three cusps. No significant aortic stenosis or aortic insufficiency is detected on this study.  Mitral valve: The mitral valve appears morphologically normal. There is trace mitral insufficiency.  Tricuspid valve: The tricuspid valve is grossly morphologically normal. There is trace tricuspid insufficiency.  Pulmonic valve: The pulmonic valve is grossly morphologically normal.  Thoracic aorta: The aortic root and proximal ascending aorta " "are of normal dimension.  Pericardium: There is no significant pericardial effusion. ______________________________________________________________________________ ______________________________________________________________________________ MMode/2D Measurements & Calculations IVSd: 0.60 cm LVIDd: 3.8 cm LVPWd: 0.70 cm LV mass(C)d: 65.3 grams LV mass(C)dI: 40.0 grams/m2 RWT: 0.37  Time Measurements MM HR: 105.0 BPM  Doppler Measurements & Calculations LV V1 max P.8 mmHg LV V1 max: 83.5 cm/sec LV V1 VTI: 11.7 cm TR max tank: 206.0 cm/sec TR max P.0 mmHg  ______________________________________________________________________________ Report approved by: Cynthia Waggoner 2021 12:24 PM           Signed by: VERENICE Frye CNP    Oncology Rooming Note    2021 9:25 AM   Jackie Urias is a 65 year old female who presents for:    Chief Complaint   Patient presents with     Oncology Clinic Visit     1 week return Malignant neoplasm of central portion of right breast in female, estrogen receptor positive, Labs / NP / Infusion today     Initial Vitals: BP (!) 154/86 (BP Location: Right arm, Patient Position: Sitting, Cuff Size: Adult Regular)   Pulse 89   Temp 98.9  F (37.2  C)   Resp 20   Ht 1.676 m (5' 5.98\")   Wt 56.6 kg (124 lb 11.2 oz)   SpO2 96%   BMI 20.14 kg/m   Estimated body mass index is 20.14 kg/m  as calculated from the following:    Height as of this encounter: 1.676 m (5' 5.98\").    Weight as of this encounter: 56.6 kg (124 lb 11.2 oz). Body surface area is 1.62 meters squared.  No Pain (0) Comment: Data Unavailable   No LMP recorded.  Allergies reviewed: Yes  Medications reviewed: Yes    Medications: Medication refills not needed today.  Pharmacy name entered into [employer]: [facility] - [place], [place] - [address]    Clinical concerns  1 week return Malignant neoplasm of central portion of right breast in female, estrogen receptor positive, Labs " / NP / Infusion today.   Check Right forearm bruise and inflammation.   Why chemo is needed.       Erika Hilton, [facility]                  Again, thank you for allowing me to participate in the care of your patient.        Sincerely,        VERENICE Frye CNP

## 2021-11-29 ENCOUNTER — LAB (OUTPATIENT)
Dept: INFUSION THERAPY | Facility: CLINIC | Age: 66
End: 2021-11-29
Attending: INTERNAL MEDICINE
Payer: COMMERCIAL

## 2021-11-29 VITALS
BODY MASS INDEX: 20.25 KG/M2 | WEIGHT: 125.4 LBS | SYSTOLIC BLOOD PRESSURE: 125 MMHG | DIASTOLIC BLOOD PRESSURE: 79 MMHG | HEART RATE: 78 BPM

## 2021-11-29 DIAGNOSIS — Z17.0 MALIGNANT NEOPLASM OF CENTRAL PORTION OF RIGHT BREAST IN FEMALE, ESTROGEN RECEPTOR POSITIVE (H): Primary | ICD-10-CM

## 2021-11-29 DIAGNOSIS — C50.111 MALIGNANT NEOPLASM OF CENTRAL PORTION OF RIGHT BREAST IN FEMALE, ESTROGEN RECEPTOR POSITIVE (H): Primary | ICD-10-CM

## 2021-11-29 LAB
BASOPHILS # BLD MANUAL: 0 10E3/UL (ref 0–0.2)
BASOPHILS NFR BLD MANUAL: 0 %
EOSINOPHIL # BLD MANUAL: 0 10E3/UL (ref 0–0.7)
EOSINOPHIL NFR BLD MANUAL: 0 %
ERYTHROCYTE [DISTWIDTH] IN BLOOD BY AUTOMATED COUNT: 12.4 % (ref 10–15)
HCT VFR BLD AUTO: 35.7 % (ref 35–47)
HGB BLD-MCNC: 11.6 G/DL (ref 11.7–15.7)
LYMPHOCYTES # BLD MANUAL: 15.7 10E3/UL (ref 0.8–5.3)
LYMPHOCYTES NFR BLD MANUAL: 67 %
MCH RBC QN AUTO: 31.2 PG (ref 26.5–33)
MCHC RBC AUTO-ENTMCNC: 32.5 G/DL (ref 31.5–36.5)
MCV RBC AUTO: 96 FL (ref 78–100)
MONOCYTES # BLD MANUAL: 1.6 10E3/UL (ref 0–1.3)
MONOCYTES NFR BLD MANUAL: 7 %
NEUTROPHILS # BLD MANUAL: 6.1 10E3/UL (ref 1.6–8.3)
NEUTROPHILS NFR BLD MANUAL: 26 %
PLAT MORPH BLD: ABNORMAL
PLATELET # BLD AUTO: 281 10E3/UL (ref 150–450)
RBC # BLD AUTO: 3.72 10E6/UL (ref 3.8–5.2)
RBC MORPH BLD: ABNORMAL
WBC # BLD AUTO: 23.5 10E3/UL (ref 4–11)

## 2021-11-29 PROCEDURE — 258N000003 HC RX IP 258 OP 636: Performed by: INTERNAL MEDICINE

## 2021-11-29 PROCEDURE — 250N000011 HC RX IP 250 OP 636: Performed by: INTERNAL MEDICINE

## 2021-11-29 PROCEDURE — 96417 CHEMO IV INFUS EACH ADDL SEQ: CPT

## 2021-11-29 PROCEDURE — 250N000011 HC RX IP 250 OP 636: Mod: JW | Performed by: NURSE PRACTITIONER

## 2021-11-29 PROCEDURE — 85027 COMPLETE CBC AUTOMATED: CPT | Performed by: INTERNAL MEDICINE

## 2021-11-29 PROCEDURE — 96413 CHEMO IV INFUSION 1 HR: CPT

## 2021-11-29 PROCEDURE — 96375 TX/PRO/DX INJ NEW DRUG ADDON: CPT

## 2021-11-29 PROCEDURE — 36591 DRAW BLOOD OFF VENOUS DEVICE: CPT

## 2021-11-29 RX ORDER — PACLITAXEL 100 MG/20ML
100 INJECTION, POWDER, LYOPHILIZED, FOR SUSPENSION INTRAVENOUS ONCE
Status: COMPLETED | OUTPATIENT
Start: 2021-11-29 | End: 2021-11-29

## 2021-11-29 RX ORDER — HEPARIN SODIUM (PORCINE) LOCK FLUSH IV SOLN 100 UNIT/ML 100 UNIT/ML
5 SOLUTION INTRAVENOUS
Status: DISCONTINUED | OUTPATIENT
Start: 2021-11-29 | End: 2021-11-29 | Stop reason: HOSPADM

## 2021-11-29 RX ADMIN — Medication 5 ML: at 14:17

## 2021-11-29 RX ADMIN — PACLITAXEL 160 MG: 100 INJECTION, POWDER, LYOPHILIZED, FOR SUSPENSION INTRAVENOUS at 13:10

## 2021-11-29 RX ADMIN — FAMOTIDINE 20 MG: 20 INJECTION, SOLUTION INTRAVENOUS at 12:11

## 2021-11-29 RX ADMIN — DEXAMETHASONE SODIUM PHOSPHATE 20 MG: 10 INJECTION, SOLUTION INTRAMUSCULAR; INTRAVENOUS at 12:24

## 2021-11-29 RX ADMIN — DIPHENHYDRAMINE HYDROCHLORIDE 50 MG: 50 INJECTION, SOLUTION INTRAMUSCULAR; INTRAVENOUS at 12:37

## 2021-11-29 RX ADMIN — SODIUM CHLORIDE 250 ML: 9 INJECTION, SOLUTION INTRAVENOUS at 12:11

## 2021-11-29 RX ADMIN — SODIUM CHLORIDE 112 MG: 9 INJECTION, SOLUTION INTRAVENOUS at 13:41

## 2021-11-29 NOTE — PROGRESS NOTES
Infusion Nursing Note:  Jackie CANALES Adonay presents today for Abraxane/Trazimera.    Patient seen by provider today: No   present during visit today: Not Applicable.    Note: N/A.      Intravenous Access:  Labs drawn without difficulty.  Implanted Port.    Treatment Conditions:  Lab Results   Component Value Date    HGB 11.6 (L) 11/29/2021    WBC 23.5 (H) 11/29/2021    ANEU 6.1 11/29/2021     11/29/2021      Results reviewed, labs MET treatment parameters, ok to proceed with treatment.    Echocardiogram completed on 11/12/21.  EF 60-65%    Post Infusion Assessment:  Patient tolerated infusion without incident.  Blood return noted pre and post infusion.  Site patent and intact, free from redness, edema or discomfort.  No evidence of extravasations.  Access discontinued per protocol.       Discharge Plan:   Patient discharged in stable condition accompanied by: self.  Departure Mode: Ambulatory.  Pt to return on 12/6/21 at 10:45 am for pac labs followed by C4D1 Abraxane/Trazimercolton 11:00 am.       Jennifer Gsos RN

## 2021-12-02 ENCOUNTER — TELEPHONE (OUTPATIENT)
Dept: ONCOLOGY | Facility: HOSPITAL | Age: 66
End: 2021-12-02
Payer: COMMERCIAL

## 2021-12-02 ENCOUNTER — HOSPITAL ENCOUNTER (OUTPATIENT)
Dept: ULTRASOUND IMAGING | Facility: CLINIC | Age: 66
Discharge: HOME | End: 2021-12-02
Attending: INTERNAL MEDICINE | Admitting: INTERNAL MEDICINE
Payer: COMMERCIAL

## 2021-12-02 DIAGNOSIS — K52.1 CHEMOTHERAPY INDUCED DIARRHEA: ICD-10-CM

## 2021-12-02 DIAGNOSIS — M25.432 SWELLING OF JOINT OF LEFT WRIST: ICD-10-CM

## 2021-12-02 DIAGNOSIS — R10.9 STOMACH PAIN: Primary | ICD-10-CM

## 2021-12-02 DIAGNOSIS — T45.1X5A CHEMOTHERAPY INDUCED DIARRHEA: ICD-10-CM

## 2021-12-02 DIAGNOSIS — R22.32 LOCALIZED SWELLING ON LEFT HAND: ICD-10-CM

## 2021-12-02 PROCEDURE — 93971 EXTREMITY STUDY: CPT | Mod: LT

## 2021-12-02 RX ORDER — PACLITAXEL 100 MG/20ML
100 INJECTION, POWDER, LYOPHILIZED, FOR SUSPENSION INTRAVENOUS ONCE
Status: CANCELLED
Start: 2021-12-06 | End: 2021-12-06

## 2021-12-02 RX ORDER — SUCRALFATE ORAL 1 G/10ML
SUSPENSION ORAL
Qty: 420 ML | Refills: 1 | Status: SHIPPED | OUTPATIENT
Start: 2021-12-02

## 2021-12-02 RX ORDER — LOPERAMIDE HYDROCHLORIDE 2 MG/1
TABLET ORAL
Qty: 90 TABLET | Refills: 1 | Status: SHIPPED | OUTPATIENT
Start: 2021-12-02

## 2021-12-02 NOTE — TELEPHONE ENCOUNTER
Called patient to let her know that she does not have a DVT.  Patient appreciative of the information.  Patient to call if her swelling increases.  Justice has an infusion appointment on Monday.

## 2021-12-02 NOTE — TELEPHONE ENCOUNTER
Patient calls in today stating that she is having a lot of trouble after her chemotherapy.  She is having constant, stabbing stomach pains along with diarrhea which is happening at least 6 times per day.  She states that her left hand was half swollen 2 days ago and when she woke up this morning it is fully swollen and into her wrist and is quite tender and warm to the touch.  She states she overall feels a general weakness.  She states that her eyesight is blurry.  She has muscle aches as well as a headache.  She has tried Tylenol and ibuprofen which do not even take the edge off.  She normally uses Aleve but they told her to stay away from this.  Patient had a huge concern with reading somewhere that Abraxane causes pulmonary fibrosis.  She states that her paternal grandmother, father, 2 uncles and her aunt have all  from pulmonary fibrosis.  She is very scared about this.  After talking with Dr Powers about this, he states that the taxane type of chemotherapy can cause pneumonitis, not pulmonary fibrosis.  Of all of the taxanes, Abraxane is the least likely to cause this.  He would like the patient to take Carafate at bedtime to see if this helps with her stomach pains.  We will also have her start taking Imodium since she is not taking anything for her diarrhea.  A prescription for both of these has been sent to her pharmacy.  He would also like to get an ultrasound of her left hand to rule out a blood clot.  Patient does have a history of bilateral mastectomies so we will be able to see if there is any lymphedema going on in that area as well.  Patient verbalized understanding of the above information and was transferred to the  to get a  appointment with Dr Powers arranged.  She has been given the radiology number for [facility] so she can schedule the ultrasound as this is much closer to her.    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  I did ask her to bring that article with and discussed this with Dr Powers when she sees him next week.  She again verbalized understanding.    Maegan Zapien RN

## 2021-12-06 ENCOUNTER — TELEPHONE (OUTPATIENT)
Dept: ONCOLOGY | Facility: HOSPITAL | Age: 66
End: 2021-12-06
Payer: COMMERCIAL

## 2021-12-06 ENCOUNTER — INFUSION THERAPY VISIT (OUTPATIENT)
Dept: INFUSION THERAPY | Facility: CLINIC | Age: 66
End: 2021-12-06
Attending: INTERNAL MEDICINE
Payer: COMMERCIAL

## 2021-12-06 VITALS — TEMPERATURE: 98.2 F | SYSTOLIC BLOOD PRESSURE: 139 MMHG | HEART RATE: 95 BPM | DIASTOLIC BLOOD PRESSURE: 83 MMHG

## 2021-12-06 VITALS
BODY MASS INDEX: 20.12 KG/M2 | DIASTOLIC BLOOD PRESSURE: 78 MMHG | HEART RATE: 77 BPM | WEIGHT: 124.6 LBS | SYSTOLIC BLOOD PRESSURE: 134 MMHG

## 2021-12-06 DIAGNOSIS — C50.111 MALIGNANT NEOPLASM OF CENTRAL PORTION OF RIGHT BREAST IN FEMALE, ESTROGEN RECEPTOR POSITIVE (H): Primary | ICD-10-CM

## 2021-12-06 DIAGNOSIS — Z17.0 MALIGNANT NEOPLASM OF CENTRAL PORTION OF RIGHT BREAST IN FEMALE, ESTROGEN RECEPTOR POSITIVE (H): Primary | ICD-10-CM

## 2021-12-06 LAB
ALBUMIN SERPL-MCNC: 3.4 G/DL (ref 3.4–5)
ALP SERPL-CCNC: 57 U/L (ref 40–150)
ALT SERPL W P-5'-P-CCNC: 17 U/L (ref 0–50)
AST SERPL W P-5'-P-CCNC: 8 U/L (ref 0–45)
BASOPHILS # BLD MANUAL: 0 10E3/UL (ref 0–0.2)
BASOPHILS NFR BLD MANUAL: 0 %
BILIRUB DIRECT SERPL-MCNC: <0.1 MG/DL (ref 0–0.2)
BILIRUB SERPL-MCNC: 0.3 MG/DL (ref 0.2–1.3)
EOSINOPHIL # BLD MANUAL: 0 10E3/UL (ref 0–0.7)
EOSINOPHIL NFR BLD MANUAL: 0 %
ERYTHROCYTE [DISTWIDTH] IN BLOOD BY AUTOMATED COUNT: 12.4 % (ref 10–15)
HCT VFR BLD AUTO: 35.2 % (ref 35–47)
HGB BLD-MCNC: 11.5 G/DL (ref 11.7–15.7)
LYMPHOCYTES # BLD MANUAL: 14.8 10E3/UL (ref 0.8–5.3)
LYMPHOCYTES NFR BLD MANUAL: 67 %
MCH RBC QN AUTO: 31.3 PG (ref 26.5–33)
MCHC RBC AUTO-ENTMCNC: 32.7 G/DL (ref 31.5–36.5)
MCV RBC AUTO: 96 FL (ref 78–100)
MONOCYTES # BLD MANUAL: 0.2 10E3/UL (ref 0–1.3)
MONOCYTES NFR BLD MANUAL: 1 %
NEUTROPHILS # BLD MANUAL: 7.1 10E3/UL (ref 1.6–8.3)
NEUTROPHILS NFR BLD MANUAL: 32 %
PLAT MORPH BLD: ABNORMAL
PLATELET # BLD AUTO: 304 10E3/UL (ref 150–450)
PROT SERPL-MCNC: 6.6 G/DL (ref 6.8–8.8)
RBC # BLD AUTO: 3.68 10E6/UL (ref 3.8–5.2)
RBC MORPH BLD: ABNORMAL
WBC # BLD AUTO: 22.1 10E3/UL (ref 4–11)

## 2021-12-06 PROCEDURE — 258N000003 HC RX IP 258 OP 636: Performed by: INTERNAL MEDICINE

## 2021-12-06 PROCEDURE — 85027 COMPLETE CBC AUTOMATED: CPT | Performed by: INTERNAL MEDICINE

## 2021-12-06 PROCEDURE — 250N000011 HC RX IP 250 OP 636: Performed by: INTERNAL MEDICINE

## 2021-12-06 PROCEDURE — 96413 CHEMO IV INFUSION 1 HR: CPT

## 2021-12-06 PROCEDURE — 80076 HEPATIC FUNCTION PANEL: CPT | Performed by: INTERNAL MEDICINE

## 2021-12-06 RX ORDER — HEPARIN SODIUM (PORCINE) LOCK FLUSH IV SOLN 100 UNIT/ML 100 UNIT/ML
5 SOLUTION INTRAVENOUS
Status: DISCONTINUED | OUTPATIENT
Start: 2021-12-06 | End: 2021-12-06 | Stop reason: HOSPADM

## 2021-12-06 RX ADMIN — SODIUM CHLORIDE 250 ML: 9 INJECTION, SOLUTION INTRAVENOUS at 12:15

## 2021-12-06 RX ADMIN — Medication 5 ML: at 12:57

## 2021-12-06 RX ADMIN — SODIUM CHLORIDE 112 MG: 9 INJECTION, SOLUTION INTRAVENOUS at 12:12

## 2021-12-06 NOTE — PROGRESS NOTES
"Infusion Nursing Note:  Jackie Urias presents today for Trazimera.    Patient seen by provider today: No   present during visit today: Not Applicable.    Note: Received call from Maegan MALHOTRA to not give Abraxane today and only Trazimera. Patient does not want Abraxane. Dr. Powers is aware.   Patient reports 2 episodes on Friday and Saturday evening of chest tightness and SOB. The episodes lasted approx 15 minutes and resolved on their own. She states she's had bleeding and \"a few clots\" from the rectum as well. Call placed to Dr. Powers regarding symptoms, ok to give Trazimera and she will see him on Thursday.     Intravenous Access:  Implanted Port.    Treatment Conditions:  ECHO/MUGA completed 11/12/2021  EF 60-65%.      Post Infusion Assessment:  Patient tolerated infusion without incident.  Blood return noted pre and post infusion.  Site patent and intact, free from redness, edema or discomfort.  No evidence of extravasations.  Access discontinued per protocol.       Discharge Plan:   Patient discharged in stable condition accompanied by: self.  Departure Mode: Ambulatory. Pt will return 12/13/2021.       Jose Osborne RN                      "

## 2021-12-06 NOTE — TELEPHONE ENCOUNTER
Patient calls in today stating that she has thought about the discussion we had last week and she prefers to only get Herceptin infusion today.  She does not want to get chemotherapy until after she speaks with Dr Powers on 12/9.  I let her know that this is totally up to her and she prefers to have it arranged this way.  This note will be sent to Dr Powers and a call has been placed to the [facility] cancer care infusion area to let them know.  They verbalized understanding.    Maegan Zapien RN

## 2021-12-09 ENCOUNTER — ONCOLOGY VISIT (OUTPATIENT)
Dept: ONCOLOGY | Facility: HOSPITAL | Age: 66
End: 2021-12-09
Attending: INTERNAL MEDICINE
Payer: COMMERCIAL

## 2021-12-09 VITALS
TEMPERATURE: 98.6 F | DIASTOLIC BLOOD PRESSURE: 83 MMHG | BODY MASS INDEX: 20.33 KG/M2 | OXYGEN SATURATION: 98 % | RESPIRATION RATE: 16 BRPM | WEIGHT: 125.9 LBS | HEART RATE: 83 BPM | SYSTOLIC BLOOD PRESSURE: 164 MMHG

## 2021-12-09 DIAGNOSIS — Z17.0 MALIGNANT NEOPLASM OF CENTRAL PORTION OF RIGHT BREAST IN FEMALE, ESTROGEN RECEPTOR POSITIVE (H): Primary | ICD-10-CM

## 2021-12-09 DIAGNOSIS — K29.00 OTHER ACUTE GASTRITIS WITHOUT HEMORRHAGE: ICD-10-CM

## 2021-12-09 DIAGNOSIS — C50.111 MALIGNANT NEOPLASM OF CENTRAL PORTION OF RIGHT BREAST IN FEMALE, ESTROGEN RECEPTOR POSITIVE (H): Primary | ICD-10-CM

## 2021-12-09 PROCEDURE — G0463 HOSPITAL OUTPT CLINIC VISIT: HCPCS

## 2021-12-09 PROCEDURE — 99417 PROLNG OP E/M EACH 15 MIN: CPT | Performed by: INTERNAL MEDICINE

## 2021-12-09 PROCEDURE — 99215 OFFICE O/P EST HI 40 MIN: CPT | Performed by: INTERNAL MEDICINE

## 2021-12-09 RX ORDER — DIPHENHYDRAMINE HYDROCHLORIDE 50 MG/ML
50 INJECTION INTRAMUSCULAR; INTRAVENOUS
Status: CANCELLED
Start: 2022-03-21

## 2021-12-09 RX ORDER — EPINEPHRINE 1 MG/ML
0.3 INJECTION, SOLUTION INTRAMUSCULAR; SUBCUTANEOUS EVERY 5 MIN PRN
Status: CANCELLED | OUTPATIENT
Start: 2022-02-07

## 2021-12-09 RX ORDER — PACLITAXEL 100 MG/20ML
100 INJECTION, POWDER, LYOPHILIZED, FOR SUSPENSION INTRAVENOUS ONCE
Status: CANCELLED
Start: 2022-01-17 | End: 2022-01-03

## 2021-12-09 RX ORDER — NALOXONE HYDROCHLORIDE 0.4 MG/ML
0.2 INJECTION, SOLUTION INTRAMUSCULAR; INTRAVENOUS; SUBCUTANEOUS
Status: CANCELLED | OUTPATIENT
Start: 2022-03-21

## 2021-12-09 RX ORDER — HEPARIN SODIUM,PORCINE 10 UNIT/ML
5 VIAL (ML) INTRAVENOUS
Status: CANCELLED | OUTPATIENT
Start: 2022-01-03

## 2021-12-09 RX ORDER — ALBUTEROL SULFATE 0.83 MG/ML
2.5 SOLUTION RESPIRATORY (INHALATION)
Status: CANCELLED | OUTPATIENT
Start: 2022-02-07

## 2021-12-09 RX ORDER — PANTOPRAZOLE SODIUM 40 MG/1
40 TABLET, DELAYED RELEASE ORAL 2 TIMES DAILY
Qty: 60 TABLET | Refills: 3 | Status: SHIPPED | OUTPATIENT
Start: 2021-12-09

## 2021-12-09 RX ORDER — ALBUTEROL SULFATE 90 UG/1
1-2 AEROSOL, METERED RESPIRATORY (INHALATION)
Status: CANCELLED
Start: 2022-02-07

## 2021-12-09 RX ORDER — METHYLPREDNISOLONE SODIUM SUCCINATE 125 MG/2ML
125 INJECTION, POWDER, LYOPHILIZED, FOR SOLUTION INTRAMUSCULAR; INTRAVENOUS
Status: CANCELLED
Start: 2022-02-28

## 2021-12-09 RX ORDER — ALBUTEROL SULFATE 0.83 MG/ML
2.5 SOLUTION RESPIRATORY (INHALATION)
Status: CANCELLED | OUTPATIENT
Start: 2022-01-31

## 2021-12-09 RX ORDER — ALBUTEROL SULFATE 90 UG/1
1-2 AEROSOL, METERED RESPIRATORY (INHALATION)
Status: CANCELLED
Start: 2022-02-28

## 2021-12-09 RX ORDER — MEPERIDINE HYDROCHLORIDE 25 MG/ML
25 INJECTION INTRAMUSCULAR; INTRAVENOUS; SUBCUTANEOUS EVERY 30 MIN PRN
Status: CANCELLED | OUTPATIENT
Start: 2022-01-03

## 2021-12-09 RX ORDER — ACETAMINOPHEN 325 MG/1
650 TABLET ORAL
Status: CANCELLED | OUTPATIENT
Start: 2022-02-28

## 2021-12-09 RX ORDER — PACLITAXEL 100 MG/20ML
100 INJECTION, POWDER, LYOPHILIZED, FOR SUSPENSION INTRAVENOUS ONCE
Status: CANCELLED
Start: 2021-12-27 | End: 2021-12-13

## 2021-12-09 RX ORDER — HEPARIN SODIUM,PORCINE 10 UNIT/ML
5 VIAL (ML) INTRAVENOUS
Status: CANCELLED | OUTPATIENT
Start: 2022-01-10

## 2021-12-09 RX ORDER — NALOXONE HYDROCHLORIDE 0.4 MG/ML
0.2 INJECTION, SOLUTION INTRAMUSCULAR; INTRAVENOUS; SUBCUTANEOUS
Status: CANCELLED | OUTPATIENT
Start: 2022-02-28

## 2021-12-09 RX ORDER — EPINEPHRINE 1 MG/ML
0.3 INJECTION, SOLUTION INTRAMUSCULAR; SUBCUTANEOUS EVERY 5 MIN PRN
Status: CANCELLED | OUTPATIENT
Start: 2022-03-21

## 2021-12-09 RX ORDER — HEPARIN SODIUM (PORCINE) LOCK FLUSH IV SOLN 100 UNIT/ML 100 UNIT/ML
5 SOLUTION INTRAVENOUS
Status: CANCELLED | OUTPATIENT
Start: 2022-01-31

## 2021-12-09 RX ORDER — DIPHENHYDRAMINE HCL 50 MG
50 CAPSULE ORAL
Status: CANCELLED
Start: 2022-02-07

## 2021-12-09 RX ORDER — DIPHENHYDRAMINE HCL 50 MG
50 CAPSULE ORAL
Status: CANCELLED
Start: 2022-02-28

## 2021-12-09 RX ORDER — HEPARIN SODIUM,PORCINE 10 UNIT/ML
5 VIAL (ML) INTRAVENOUS
Status: CANCELLED | OUTPATIENT
Start: 2022-01-17

## 2021-12-09 RX ORDER — ALBUTEROL SULFATE 0.83 MG/ML
2.5 SOLUTION RESPIRATORY (INHALATION)
Status: CANCELLED | OUTPATIENT
Start: 2022-02-28

## 2021-12-09 RX ORDER — DIPHENHYDRAMINE HCL 50 MG
50 CAPSULE ORAL
Status: CANCELLED
Start: 2022-03-21

## 2021-12-09 RX ORDER — DIPHENHYDRAMINE HYDROCHLORIDE 50 MG/ML
50 INJECTION INTRAMUSCULAR; INTRAVENOUS
Status: CANCELLED
Start: 2022-02-28

## 2021-12-09 RX ORDER — PACLITAXEL 100 MG/20ML
100 INJECTION, POWDER, LYOPHILIZED, FOR SUSPENSION INTRAVENOUS ONCE
Status: CANCELLED
Start: 2022-01-10 | End: 2021-12-27

## 2021-12-09 RX ORDER — LORAZEPAM 2 MG/ML
0.5 INJECTION INTRAMUSCULAR EVERY 4 HOURS PRN
Status: CANCELLED | OUTPATIENT
Start: 2022-01-31

## 2021-12-09 RX ORDER — HEPARIN SODIUM (PORCINE) LOCK FLUSH IV SOLN 100 UNIT/ML 100 UNIT/ML
5 SOLUTION INTRAVENOUS
Status: CANCELLED | OUTPATIENT
Start: 2022-02-28

## 2021-12-09 RX ORDER — LORAZEPAM 2 MG/ML
0.5 INJECTION INTRAMUSCULAR EVERY 4 HOURS PRN
Status: CANCELLED | OUTPATIENT
Start: 2022-02-07

## 2021-12-09 RX ORDER — ALBUTEROL SULFATE 90 UG/1
1-2 AEROSOL, METERED RESPIRATORY (INHALATION)
Status: CANCELLED
Start: 2022-03-21

## 2021-12-09 RX ORDER — ALBUTEROL SULFATE 90 UG/1
1-2 AEROSOL, METERED RESPIRATORY (INHALATION)
Status: CANCELLED
Start: 2022-01-31

## 2021-12-09 RX ORDER — HEPARIN SODIUM (PORCINE) LOCK FLUSH IV SOLN 100 UNIT/ML 100 UNIT/ML
5 SOLUTION INTRAVENOUS
Status: CANCELLED | OUTPATIENT
Start: 2022-02-07

## 2021-12-09 RX ORDER — HEPARIN SODIUM (PORCINE) LOCK FLUSH IV SOLN 100 UNIT/ML 100 UNIT/ML
5 SOLUTION INTRAVENOUS
Status: CANCELLED | OUTPATIENT
Start: 2022-03-21

## 2021-12-09 RX ORDER — METHYLPREDNISOLONE SODIUM SUCCINATE 125 MG/2ML
125 INJECTION, POWDER, LYOPHILIZED, FOR SOLUTION INTRAMUSCULAR; INTRAVENOUS
Status: CANCELLED
Start: 2022-02-07

## 2021-12-09 RX ORDER — METHYLPREDNISOLONE SODIUM SUCCINATE 125 MG/2ML
125 INJECTION, POWDER, LYOPHILIZED, FOR SOLUTION INTRAMUSCULAR; INTRAVENOUS
Status: CANCELLED
Start: 2022-01-31

## 2021-12-09 RX ORDER — DIPHENHYDRAMINE HYDROCHLORIDE 50 MG/ML
50 INJECTION INTRAMUSCULAR; INTRAVENOUS
Status: CANCELLED
Start: 2022-01-31

## 2021-12-09 RX ORDER — DIPHENHYDRAMINE HYDROCHLORIDE 50 MG/ML
50 INJECTION INTRAMUSCULAR; INTRAVENOUS
Status: CANCELLED
Start: 2022-02-07

## 2021-12-09 RX ORDER — MEPERIDINE HYDROCHLORIDE 25 MG/ML
25 INJECTION INTRAMUSCULAR; INTRAVENOUS; SUBCUTANEOUS EVERY 30 MIN PRN
Status: CANCELLED | OUTPATIENT
Start: 2022-01-10

## 2021-12-09 RX ORDER — PACLITAXEL 100 MG/20ML
100 INJECTION, POWDER, LYOPHILIZED, FOR SUSPENSION INTRAVENOUS ONCE
Status: CANCELLED
Start: 2022-01-24 | End: 2022-01-10

## 2021-12-09 RX ORDER — EPINEPHRINE 1 MG/ML
0.3 INJECTION, SOLUTION INTRAMUSCULAR; SUBCUTANEOUS EVERY 5 MIN PRN
Status: CANCELLED | OUTPATIENT
Start: 2022-01-31

## 2021-12-09 RX ORDER — NALOXONE HYDROCHLORIDE 0.4 MG/ML
0.2 INJECTION, SOLUTION INTRAMUSCULAR; INTRAVENOUS; SUBCUTANEOUS
Status: CANCELLED | OUTPATIENT
Start: 2022-01-31

## 2021-12-09 RX ORDER — NALOXONE HYDROCHLORIDE 0.4 MG/ML
0.2 INJECTION, SOLUTION INTRAMUSCULAR; INTRAVENOUS; SUBCUTANEOUS
Status: CANCELLED | OUTPATIENT
Start: 2022-02-07

## 2021-12-09 RX ORDER — LORAZEPAM 2 MG/ML
0.5 INJECTION INTRAMUSCULAR EVERY 4 HOURS PRN
Status: CANCELLED | OUTPATIENT
Start: 2022-03-21

## 2021-12-09 RX ORDER — LORAZEPAM 2 MG/ML
0.5 INJECTION INTRAMUSCULAR EVERY 4 HOURS PRN
Status: CANCELLED | OUTPATIENT
Start: 2022-02-28

## 2021-12-09 RX ORDER — MEPERIDINE HYDROCHLORIDE 25 MG/ML
25 INJECTION INTRAMUSCULAR; INTRAVENOUS; SUBCUTANEOUS EVERY 30 MIN PRN
Status: CANCELLED | OUTPATIENT
Start: 2022-01-17

## 2021-12-09 RX ORDER — HEPARIN SODIUM,PORCINE 10 UNIT/ML
5 VIAL (ML) INTRAVENOUS
Status: CANCELLED | OUTPATIENT
Start: 2022-01-24

## 2021-12-09 RX ORDER — METHYLPREDNISOLONE SODIUM SUCCINATE 125 MG/2ML
125 INJECTION, POWDER, LYOPHILIZED, FOR SOLUTION INTRAMUSCULAR; INTRAVENOUS
Status: CANCELLED
Start: 2022-03-21

## 2021-12-09 RX ORDER — DIPHENHYDRAMINE HCL 50 MG
50 CAPSULE ORAL
Status: CANCELLED
Start: 2022-01-31

## 2021-12-09 RX ORDER — EPINEPHRINE 1 MG/ML
0.3 INJECTION, SOLUTION INTRAMUSCULAR; SUBCUTANEOUS EVERY 5 MIN PRN
Status: CANCELLED | OUTPATIENT
Start: 2022-02-28

## 2021-12-09 RX ORDER — MEPERIDINE HYDROCHLORIDE 25 MG/ML
25 INJECTION INTRAMUSCULAR; INTRAVENOUS; SUBCUTANEOUS EVERY 30 MIN PRN
Status: CANCELLED | OUTPATIENT
Start: 2022-01-24

## 2021-12-09 RX ORDER — ACETAMINOPHEN 325 MG/1
650 TABLET ORAL
Status: CANCELLED | OUTPATIENT
Start: 2022-01-31

## 2021-12-09 RX ORDER — ACETAMINOPHEN 325 MG/1
650 TABLET ORAL
Status: CANCELLED | OUTPATIENT
Start: 2022-03-21

## 2021-12-09 RX ORDER — PACLITAXEL 100 MG/20ML
100 INJECTION, POWDER, LYOPHILIZED, FOR SUSPENSION INTRAVENOUS ONCE
Status: CANCELLED
Start: 2022-01-03 | End: 2021-12-20

## 2021-12-09 RX ORDER — ALBUTEROL SULFATE 0.83 MG/ML
2.5 SOLUTION RESPIRATORY (INHALATION)
Status: CANCELLED | OUTPATIENT
Start: 2022-03-21

## 2021-12-09 RX ORDER — ACETAMINOPHEN 325 MG/1
650 TABLET ORAL
Status: CANCELLED | OUTPATIENT
Start: 2022-02-07

## 2021-12-09 ASSESSMENT — PAIN SCALES - GENERAL: PAINLEVEL: MILD PAIN (3)

## 2021-12-09 NOTE — PROGRESS NOTES
"Oncology Rooming Note    December 9, 2021 10:46 AM   Jackie Urias is a 65 year old female who presents for:    Chief Complaint   Patient presents with     Oncology Clinic Visit     Malignant neoplasm of central portion of right breast in female, estrogen receptor positive (H)     Initial Vitals: BP (!) 164/83   Pulse 83   Temp 98.6  F (37  C)   Resp 16   Wt 57.1 kg (125 lb 14.4 oz)   SpO2 98%   BMI 20.33 kg/m   Estimated body mass index is 20.33 kg/m  as calculated from the following:    Height as of 11/22/21: 1.676 m (5' 5.98\").    Weight as of this encounter: 57.1 kg (125 lb 14.4 oz). Body surface area is 1.63 meters squared.  Mild Pain (3) Comment: Data Unavailable   No LMP recorded.  Allergies reviewed: Yes  Medications reviewed: Yes    Medications: Medication refills not needed today.  Pharmacy name entered into [employer]: [facility] - [place], [place] - [address]    Clinical concerns: None       Bria Conroy LPN            "

## 2021-12-09 NOTE — LETTER
"    12/9/2021         RE: Jackie Urias  [address]  [place] [address]        Dear Colleague,    Thank you for referring your patient, Jackie Urias, to the [facility] [employer] [facility]. Please see a copy of my visit note below.    Oncology Rooming Note    December 9, 2021 10:46 AM   Jackie Urias is a 65 year old female who presents for:    Chief Complaint   Patient presents with     Oncology Clinic Visit     Malignant neoplasm of central portion of right breast in female, estrogen receptor positive (H)     Initial Vitals: BP (!) 164/83   Pulse 83   Temp 98.6  F (37  C)   Resp 16   Wt 57.1 kg (125 lb 14.4 oz)   SpO2 98%   BMI 20.33 kg/m   Estimated body mass index is 20.33 kg/m  as calculated from the following:    Height as of 11/22/21: 1.676 m (5' 5.98\").    Weight as of this encounter: 57.1 kg (125 lb 14.4 oz). Body surface area is 1.63 meters squared.  Mild Pain (3) Comment: Data Unavailable   No LMP recorded.  Allergies reviewed: Yes  Medications reviewed: Yes    Medications: Medication refills not needed today.  Pharmacy name entered into SpeechTrans: [facility] - [place], [place] - [address]    Clinical concerns: None       Bria Conroy LPN              [facility] Progress Note    Patient: Jackie Urias  MRN: [id number]  Date of Service: Dec 9, 2021           Reason for visit         Problem List Items Addressed This Visit        Other    Malignant neoplasm of central portion of right breast in female, estrogen receptor positive (H) - Primary    Relevant Medications    pantoprazole (PROTONIX) 40 MG EC tablet      Other Visit Diagnoses     Other acute gastritis without hemorrhage        Relevant Medications    pantoprazole (PROTONIX) 40 MG EC tablet            Assessment      1.  Very pleasant 65 year old  woman with invasive ductal carcinoma right breast ER positive OH positive HER-2/chapin positive by FISH.  Started on adjuvant " treatment with paclitaxel and Herceptin.  Significant side effects from paclitaxel.  Paclitaxel switched to nab-paclitaxel.  Still having fair bit of side effects.  2.  Status post bilateral mastectomy with reconstruction.  She has implants expanders in place.  3.  Prior history of similar breast cancer on the left side 13 years ago in 2009.  At that time she did have 1 lymph node positive.  Got treatment with dose dense AC followed by weekly paclitaxel and Herceptin.  Herceptin was for over a year.  She did get tamoxifen as a hormonal therapy.  She also got adjuvant radiation therapy to the breast.  4.  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  Required 3 surgeries positive margin.  I believe no hormone therapy was given.  5.  Genetic testing needed. She has not made appointment with the genetic counselor yet.  6.  Incidentally found to have CLL on her lymph node. She definitely has bilateral enlarged axillary lymph nodes. Right more than the left. Now her peripheral blood is also showing lymphocytosis.    Plan      1. I had lengthy discussion with the patient regarding her overall clinical situation. I think she is having significant side effect from chemotherapy. In addition she is also having her gastritis worsened. I am going to have her start taking some Protonix. We will actually start 40 mg twice daily. Advised the patient that once Protonix starts to take effect I would recommend that she resume her chemotherapy.  2. Also she might be having some side effects because of perhaps shingles type of infection that she had on her left side. That should get a bit better as the time goes on.  3. Minimize the use of NSAIDs.  4.  Follow-up with me in a month's timeframe.    Cancer Staging  Malignant neoplasm of central portion of right breast in female, estrogen receptor positive (H)  Staging form: Breast, AJCC 8th Edition  - Pathologic stage from 10/5/2021: Stage IA (pT1c, pN0, cM0, G2, ER+, OR+,  HER2+) - Signed by Gris Benjamin APRN CNP on 11/15/2021      History of present illness        Ms. Mejia A Adonay 65 year old  postmenopausal woman who has been diagnosed with right-sided breast cancer located at 11 o'clock position presenting as focal asymmetry on the mammogram in August 2021.  This was on a routine mammogram screening timeframe.  She did not have any palpable lesion.  Diagnostic imaging revealed that she had a 3.6 cm mass at the position along with some other calcification.  She underwent biopsy which confirmed invasive ductal carcinoma ER positive IL positive HER-2 new positive by FISH.  She then also underwent biopsy of the right axillary lymph node which was negative for any metastatic carcinoma.     She underwent surgery in 5 October 2021 in the form of bilateral mastectomies with right sentinel lymph node biopsy and reconstruction bilaterally with tissue expander placement.  Pathology revealed invasive ductal carcinoma grade 211 mm in size extensive DCIS no lymphovascular invasion.  Final stage I, T1c N0 M0.     Interestingly her lymph nodes did show some evidence of CLL.     She is a prior history of left-sided breast cancer diagnosed in 24 August 2019 when she was 52 years old.  This was also ER positive IL positive and HER-2/chapin 3+.  She had surgery in 3 September 2009 in the form of lumpectomy and sentinel lymph node biopsy.  1.2 cm tumor.  1 lymph node positive.  14 additional lymph nodes were negative.     Following that she was treated with dose dense Adriamycin and cyclophosphamide while followed by weekly paclitaxel and Herceptin.  She also got radiation therapy and tamoxifen for 5 years.     She also was found to have a DCIS in the right breast intermediate grade ER positive in September 2018.  She underwent right breast partial mastectomy on 19 October 2018.  This was a 3.2 cm DCIS with positive margin.  No invasive carcinoma was noted.  She had reexcision on 25 October  2018 which did show small amount of residual DCIS and the margins were still not clear.     She underwent reexcision on 8 January 2019 by Dr. Cox which was negative for DCIS or any invasive carcinoma.     She has undergone genetic testing.     She has been seen by Dr. Bland from [facility].  She came here for second opinion.    Due to the fact that she had HER-2 positive disease.  She was recommended to consider adjuvant treatment with weekly paclitaxel with Herceptin.  She started that.  Had significant side effects.  Last week she actually did not take her paclitaxel. Her main side effects are significant abdominal pain and gastric reflux. We did give her some Carafate but that she did not tolerate very well. She is also having some pain on her left flank and a rash on that side. Also having recurrence of numbness and tingling which is most likely left over from her previous chemotherapy. Obviously very anxious as well.      Review of system      Details noted in the history of present illness.  A 14 point review of systems is otherwise negative.    Past medical history        Past Medical History:   Diagnosis Date     Cancer (H)      Fibromyalgia      GERD (gastroesophageal reflux disease)      Hernia, hiatal        Past Surgical History:   Procedure Laterality Date     BREAST SURGERY  2009    lumpectomy left breast     BREAST SURGERY  2018    lumpectomy right breast ( + 2 excisions)     CARPAL TUNNEL RELEASE RT/LT       HERNIA REPAIR      1962     IR CHEST PORT PLACEMENT > 5 YRS OF AGE  11/9/2021           Physical exam        BP (!) 164/83   Pulse 83   Temp 98.6  F (37  C)   Resp 16   Wt 57.1 kg (125 lb 14.4 oz)   SpO2 98%   BMI 20.33 kg/m          GENERAL: Alert and oriented to time place and person.  In no distress.    HEAD: Atraumatic and normocephalic.    EYES: ANDREW, EOMI. No pallor. No icterus.    Oral cavity: no mucosal lesion or tonsillar enlargement.    NECK: supple. JVP normal.No thyroid  enlargement.    LYMPH NODES: Large lymph node/lymphocele in her right axillary area. Small lymph nodes in the left axilla as well. No palpable, cervical or inguinal lymphadenopathy.    CHEST: clear to auscultation bilaterally. Symmetrical breath movements bilaterally.    CVS: S1 and S2 are Regular rate and rhythm. No murmur heard. No peripheral edema.    ABDOMEN: Soft. Not tender. Not distended. No palpable hepatomegaly or splenomegaly. No other mass palpable. Bowel sounds heard.    EXTREMITIES: Warm. No significant edema.    SKIN: She has a rash on the left side of her abdomen. It looks almost like a very faint shingles rash?        Lab results        Recent Results (from the past 168 hour(s))   Hepatic panel   Result Value Ref Range    Bilirubin Total 0.3 0.2 - 1.3 mg/dL    Bilirubin Direct <0.1 0.0 - 0.2 mg/dL    Protein Total 6.6 (L) 6.8 - 8.8 g/dL    Albumin 3.4 3.4 - 5.0 g/dL    Alkaline Phosphatase 57 40 - 150 U/L    AST 8 0 - 45 U/L    ALT 17 0 - 50 U/L   CBC with platelets and differential   Result Value Ref Range    WBC Count 22.1 (H) 4.0 - 11.0 10e3/uL    RBC Count 3.68 (L) 3.80 - 5.20 10e6/uL    Hemoglobin 11.5 (L) 11.7 - 15.7 g/dL    Hematocrit 35.2 35.0 - 47.0 %    MCV 96 78 - 100 fL    MCH 31.3 26.5 - 33.0 pg    MCHC 32.7 31.5 - 36.5 g/dL    RDW 12.4 10.0 - 15.0 %    Platelet Count 304 150 - 450 10e3/uL   Manual Differential   Result Value Ref Range    % Neutrophils 32 %    % Lymphocytes 67 %    % Monocytes 1 %    % Eosinophils 0 %    % Basophils 0 %    Absolute Neutrophils 7.1 1.6 - 8.3 10e3/uL    Absolute Lymphocytes 14.8 (H) 0.8 - 5.3 10e3/uL    Absolute Monocytes 0.2 0.0 - 1.3 10e3/uL    Absolute Eosinophils 0.0 0.0 - 0.7 10e3/uL    Absolute Basophils 0.0 0.0 - 0.2 10e3/uL    RBC Morphology Confirmed RBC Indices     Platelet Assessment  Automated Count Confirmed. Platelet morphology is normal.     Automated Count Confirmed. Platelet morphology is normal.         Imaging results        US Upper  Extremity Venous Duplex Left    Result Date: 2021  US UPPER EXTREMITY VENOUS DUPLEX LEFT  2021 3:55 PM HISTORY: Left hand wrist and hand swelling; history of bilateral mastectomies. History of breast cancer. Localized swelling on left hand. Swelling of joint of left wrist. COMPARISON: None. TECHNIQUE: Color Doppler and spectral waveform analysis performed throughout the deep and superficial veins of the left upper extremity. FINDINGS: The left internal jugular, subclavian, axillary, and brachial veins demonstrate normal blood flow, compression (where applicable), and augmentation. Radial and ulnar veins are compressible. Visible basilic and cephalic veins are patent. Contralateral right subclavian vein is patent.     IMPRESSION: Negative for deep venous thrombosis in the left upper extremity. ANDRE NEVAREZ MD   SYSTEM ID:  [id number]    Echocardiogram Limited    Result Date: 2021  [id number]^[id number]^[place] [address]  Name: ALBERTO CHEN MRN: [id number] : 1955 Study Date: 2021 09:06 AM Age: 65 yrs Gender: Female Patient Location: [facility] Reason For Study: Malignant neoplasm of central portion of right breast in female, Ordering Physician: SIRISHA MATTHEW Referring Physician: SIRISHA MATTHEW Performed By: ACE  BSA: 1.6 m2 Height: 66 in Weight: 124 lb HR: 113 BP: 145/94 mmHg ______________________________________________________________________________ Procedure Limited Echo Adult. ______________________________________________________________________________ Interpretation Summary  1. Limited echocardiographic study. 2. Normal left ventricular size and systolic performance with a visually estimated ejection fraction of 60-65%. 3. No significant valvular heart disease is identified on this study. 4. Normal right ventricular size and systolic performance.  "______________________________________________________________________________ Left ventricle: Normal left ventricular size and systolic performance with a visually estimated ejection fraction of 60-65%. There is normal regional wall motion. Left ventricular wall thickness is normal.  Assessment of LV Diastolic Function: Examination was performed as a \"limited study\". Assessment of diastolic filling consequently not performed.  Right ventricle: Normal right ventricular size and systolic performance.  Left atrium: The left atrium is of normal size.  Right atrium: The right atrium is of normal size.  IVC: The IVC is of normal caliber.  Aortic valve: The aortic valve is not well visualized, but suspected to be comprised of three cusps. No significant aortic stenosis or aortic insufficiency is detected on this study.  Mitral valve: The mitral valve appears morphologically normal. There is trace mitral insufficiency.  Tricuspid valve: The tricuspid valve is grossly morphologically normal. There is trace tricuspid insufficiency.  Pulmonic valve: The pulmonic valve is grossly morphologically normal.  Thoracic aorta: The aortic root and proximal ascending aorta are of normal dimension.  Pericardium: There is no significant pericardial effusion. ______________________________________________________________________________ ______________________________________________________________________________ MMode/2D Measurements & Calculations IVSd: 0.60 cm LVIDd: 3.8 cm LVPWd: 0.70 cm LV mass(C)d: 65.3 grams LV mass(C)dI: 40.0 grams/m2 RWT: 0.37  Time Measurements MM HR: 105.0 BPM  Doppler Measurements & Calculations LV V1 max P.8 mmHg LV V1 max: 83.5 cm/sec LV V1 VTI: 11.7 cm TR max tank: 206.0 cm/sec TR max P.0 mmHg  ______________________________________________________________________________ Report approved by: Cynthia Waggoner 2021 12:24 PM         Total time spent on the date of service is 65 " minutes.    Signed by: Heri [place] MD, MD      This note has been dictated using voice recognition software. Any grammatical or context distortions are unintentional and inherent to the software        Again, thank you for allowing me to participate in the care of your patient.        Sincerely,        Heri [place] MD, MD

## 2021-12-09 NOTE — PROGRESS NOTES
[facility] Hematology and Oncology Progress Note    Patient: Jackie Urias  MRN: [id number]  Date of Service: Dec 9, 2021           Reason for visit         Problem List Items Addressed This Visit        Other    Malignant neoplasm of central portion of right breast in female, estrogen receptor positive (H) - Primary    Relevant Medications    pantoprazole (PROTONIX) 40 MG EC tablet      Other Visit Diagnoses     Other acute gastritis without hemorrhage        Relevant Medications    pantoprazole (PROTONIX) 40 MG EC tablet            Assessment      1.  Very pleasant 65 year old  woman with invasive ductal carcinoma right breast ER positive DE positive HER-2/chapin positive by FISH.  Started on adjuvant treatment with paclitaxel and Herceptin.  Significant side effects from paclitaxel.  Paclitaxel switched to nab-paclitaxel.  Still having fair bit of side effects.  2.  Status post bilateral mastectomy with reconstruction.  She has implants expanders in place.  3.  Prior history of similar breast cancer on the left side 13 years ago in 2009.  At that time she did have 1 lymph node positive.  Got treatment with dose dense AC followed by weekly paclitaxel and Herceptin.  Herceptin was for over a year.  She did get tamoxifen as a hormonal therapy.  She also got adjuvant radiation therapy to the breast.  4.  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  Required 3 surgeries positive margin.  I believe no hormone therapy was given.  5.  Genetic testing needed. She has not made appointment with the genetic counselor yet.  6.  Incidentally found to have CLL on her lymph node. She definitely has bilateral enlarged axillary lymph nodes. Right more than the left. Now her peripheral blood is also showing lymphocytosis.    Plan      1. I had lengthy discussion with the patient regarding her overall clinical situation. I think she is having significant side effect from chemotherapy. In addition she is also  having her gastritis worsened. I am going to have her start taking some Protonix. We will actually start 40 mg twice daily. Advised the patient that once Protonix starts to take effect I would recommend that she resume her chemotherapy.  2. Also she might be having some side effects because of perhaps shingles type of infection that she had on her left side. That should get a bit better as the time goes on.  3. Minimize the use of NSAIDs.  4.  Follow-up with me in a month's timeframe.    Cancer Staging  Malignant neoplasm of central portion of right breast in female, estrogen receptor positive (H)  Staging form: Breast, AJCC 8th Edition  - Pathologic stage from 10/5/2021: Stage IA (pT1c, pN0, cM0, G2, ER+, WY+, HER2+) - Signed by Gris Benjamin APRN CNP on 11/15/2021      History of present illness        Ms. Mejia A Adonay 65 year old  postmenopausal woman who has been diagnosed with right-sided breast cancer located at 11 o'clock position presenting as focal asymmetry on the mammogram in August 2021.  This was on a routine mammogram screening timeframe.  She did not have any palpable lesion.  Diagnostic imaging revealed that she had a 3.6 cm mass at the position along with some other calcification.  She underwent biopsy which confirmed invasive ductal carcinoma ER positive WY positive HER-2 new positive by FISH.  She then also underwent biopsy of the right axillary lymph node which was negative for any metastatic carcinoma.     She underwent surgery in 5 October 2021 in the form of bilateral mastectomies with right sentinel lymph node biopsy and reconstruction bilaterally with tissue expander placement.  Pathology revealed invasive ductal carcinoma grade 211 mm in size extensive DCIS no lymphovascular invasion.  Final stage I, T1c N0 M0.     Interestingly her lymph nodes did show some evidence of CLL.     She is a prior history of left-sided breast cancer diagnosed in 24 August 2019 when she was 52 years  old.  This was also ER positive WY positive and HER-2/chapin 3+.  She had surgery in 3 September 2009 in the form of lumpectomy and sentinel lymph node biopsy.  1.2 cm tumor.  1 lymph node positive.  14 additional lymph nodes were negative.     Following that she was treated with dose dense Adriamycin and cyclophosphamide while followed by weekly paclitaxel and Herceptin.  She also got radiation therapy and tamoxifen for 5 years.     She also was found to have a DCIS in the right breast intermediate grade ER positive in September 2018.  She underwent right breast partial mastectomy on 19 October 2018.  This was a 3.2 cm DCIS with positive margin.  No invasive carcinoma was noted.  She had reexcision on 25 October 2018 which did show small amount of residual DCIS and the margins were still not clear.     She underwent reexcision on 8 January 2019 by Dr. Cox which was negative for DCIS or any invasive carcinoma.     She has undergone genetic testing.     She has been seen by Dr. Bland from [facility].  She came here for second opinion.    Due to the fact that she had HER-2 positive disease.  She was recommended to consider adjuvant treatment with weekly paclitaxel with Herceptin.  She started that.  Had significant side effects.  Last week she actually did not take her paclitaxel. Her main side effects are significant abdominal pain and gastric reflux. We did give her some Carafate but that she did not tolerate very well. She is also having some pain on her left flank and a rash on that side. Also having recurrence of numbness and tingling which is most likely left over from her previous chemotherapy. Obviously very anxious as well.      Review of system      Details noted in the history of present illness.  A 14 point review of systems is otherwise negative.    Past medical history        Past Medical History:   Diagnosis Date     Cancer (H)      Fibromyalgia      GERD (gastroesophageal reflux disease)      Hernia,  hiatal        Past Surgical History:   Procedure Laterality Date     BREAST SURGERY  2009    lumpectomy left breast     BREAST SURGERY  2018    lumpectomy right breast ( + 2 excisions)     CARPAL TUNNEL RELEASE RT/LT       HERNIA REPAIR      1962     IR CHEST PORT PLACEMENT > 5 YRS OF AGE  11/9/2021           Physical exam        BP (!) 164/83   Pulse 83   Temp 98.6  F (37  C)   Resp 16   Wt 57.1 kg (125 lb 14.4 oz)   SpO2 98%   BMI 20.33 kg/m          GENERAL: Alert and oriented to time place and person.  In no distress.    HEAD: Atraumatic and normocephalic.    EYES: ANDREW, EOMI. No pallor. No icterus.    Oral cavity: no mucosal lesion or tonsillar enlargement.    NECK: supple. JVP normal.No thyroid enlargement.    LYMPH NODES: Large lymph node/lymphocele in her right axillary area. Small lymph nodes in the left axilla as well. No palpable, cervical or inguinal lymphadenopathy.    CHEST: clear to auscultation bilaterally. Symmetrical breath movements bilaterally.    CVS: S1 and S2 are Regular rate and rhythm. No murmur heard. No peripheral edema.    ABDOMEN: Soft. Not tender. Not distended. No palpable hepatomegaly or splenomegaly. No other mass palpable. Bowel sounds heard.    EXTREMITIES: Warm. No significant edema.    SKIN: She has a rash on the left side of her abdomen. It looks almost like a very faint shingles rash?        Lab results        Recent Results (from the past 168 hour(s))   Hepatic panel   Result Value Ref Range    Bilirubin Total 0.3 0.2 - 1.3 mg/dL    Bilirubin Direct <0.1 0.0 - 0.2 mg/dL    Protein Total 6.6 (L) 6.8 - 8.8 g/dL    Albumin 3.4 3.4 - 5.0 g/dL    Alkaline Phosphatase 57 40 - 150 U/L    AST 8 0 - 45 U/L    ALT 17 0 - 50 U/L   CBC with platelets and differential   Result Value Ref Range    WBC Count 22.1 (H) 4.0 - 11.0 10e3/uL    RBC Count 3.68 (L) 3.80 - 5.20 10e6/uL    Hemoglobin 11.5 (L) 11.7 - 15.7 g/dL    Hematocrit 35.2 35.0 - 47.0 %    MCV 96 78 - 100 fL    MCH 31.3  26.5 - 33.0 pg    MCHC 32.7 31.5 - 36.5 g/dL    RDW 12.4 10.0 - 15.0 %    Platelet Count 304 150 - 450 10e3/uL   Manual Differential   Result Value Ref Range    % Neutrophils 32 %    % Lymphocytes 67 %    % Monocytes 1 %    % Eosinophils 0 %    % Basophils 0 %    Absolute Neutrophils 7.1 1.6 - 8.3 10e3/uL    Absolute Lymphocytes 14.8 (H) 0.8 - 5.3 10e3/uL    Absolute Monocytes 0.2 0.0 - 1.3 10e3/uL    Absolute Eosinophils 0.0 0.0 - 0.7 10e3/uL    Absolute Basophils 0.0 0.0 - 0.2 10e3/uL    RBC Morphology Confirmed RBC Indices     Platelet Assessment  Automated Count Confirmed. Platelet morphology is normal.     Automated Count Confirmed. Platelet morphology is normal.         Imaging results        US Upper Extremity Venous Duplex Left    Result Date: 2021  US UPPER EXTREMITY VENOUS DUPLEX LEFT  2021 3:55 PM HISTORY: Left hand wrist and hand swelling; history of bilateral mastectomies. History of breast cancer. Localized swelling on left hand. Swelling of joint of left wrist. COMPARISON: None. TECHNIQUE: Color Doppler and spectral waveform analysis performed throughout the deep and superficial veins of the left upper extremity. FINDINGS: The left internal jugular, subclavian, axillary, and brachial veins demonstrate normal blood flow, compression (where applicable), and augmentation. Radial and ulnar veins are compressible. Visible basilic and cephalic veins are patent. Contralateral right subclavian vein is patent.     IMPRESSION: Negative for deep venous thrombosis in the left upper extremity. ANDRE NEVAREZ MD   SYSTEM ID:  [id number]    Echocardiogram Limited    Result Date: 2021  [id number]^[place] [id number]  Name: ALBERTO CHEN MRN: [id number] : 1955 Study Date: 2021 09:06 AM Age: 65 yrs Gender: Female Patient Location: [facility] Reason For Study: Malignant neoplasm of central portion of right breast  "in female, Ordering Physician: SIRISHA MATTHEW Referring Physician: SIRISHA MATTHEW Performed By: ACE  BSA: 1.6 m2 Height: 66 in Weight: 124 lb HR: 113 BP: 145/94 mmHg ______________________________________________________________________________ Procedure Limited Echo Adult. ______________________________________________________________________________ Interpretation Summary  1. Limited echocardiographic study. 2. Normal left ventricular size and systolic performance with a visually estimated ejection fraction of 60-65%. 3. No significant valvular heart disease is identified on this study. 4. Normal right ventricular size and systolic performance. ______________________________________________________________________________ Left ventricle: Normal left ventricular size and systolic performance with a visually estimated ejection fraction of 60-65%. There is normal regional wall motion. Left ventricular wall thickness is normal.  Assessment of LV Diastolic Function: Examination was performed as a \"limited study\". Assessment of diastolic filling consequently not performed.  Right ventricle: Normal right ventricular size and systolic performance.  Left atrium: The left atrium is of normal size.  Right atrium: The right atrium is of normal size.  IVC: The IVC is of normal caliber.  Aortic valve: The aortic valve is not well visualized, but suspected to be comprised of three cusps. No significant aortic stenosis or aortic insufficiency is detected on this study.  Mitral valve: The mitral valve appears morphologically normal. There is trace mitral insufficiency.  Tricuspid valve: The tricuspid valve is grossly morphologically normal. There is trace tricuspid insufficiency.  Pulmonic valve: The pulmonic valve is grossly morphologically normal.  Thoracic aorta: The aortic root and proximal ascending aorta are of normal dimension.  Pericardium: There is no significant pericardial effusion. " ______________________________________________________________________________ ______________________________________________________________________________ MMode/2D Measurements & Calculations IVSd: 0.60 cm LVIDd: 3.8 cm LVPWd: 0.70 cm LV mass(C)d: 65.3 grams LV mass(C)dI: 40.0 grams/m2 RWT: 0.37  Time Measurements MM HR: 105.0 BPM  Doppler Measurements & Calculations LV V1 max P.8 mmHg LV V1 max: 83.5 cm/sec LV V1 VTI: 11.7 cm TR max tank: 206.0 cm/sec TR max P.0 mmHg  ______________________________________________________________________________ Report approved by: Cynthia Waggoner 2021 12:24 PM         Total time spent on the date of service is 65 minutes.    Signed by: Heri Powers MD, MD      This note has been dictated using voice recognition software. Any grammatical or context distortions are unintentional and inherent to the software

## 2021-12-16 ENCOUNTER — TELEPHONE (OUTPATIENT)
Dept: ONCOLOGY | Facility: HOSPITAL | Age: 66
End: 2021-12-16
Payer: COMMERCIAL

## 2021-12-16 NOTE — TELEPHONE ENCOUNTER
Patient calls in today stating she wants to give an update to Dr Powers regarding her plan of care going forward.  This comes after she was in the office last week and had a consultation/follow-up visit with him.  She would like a call back from the RN care coordinator to give this update.    This message is being routed over to [facility] for the patient.    Maegan Zapien RN    Patient is wanting to stop treatment and just do the herceptin every three weeks.  Per Dr. Powers, this is not what he would recommend.  He would be willing to decrease her dose if needed.  Patient is going to think about it and call us back with her decision.      Cora calls today to let us know she is going to stop treatment except for the Herceptin.  She will cancel her other appointments.  Dr. Powers aware.

## 2021-12-27 ENCOUNTER — INFUSION THERAPY VISIT (OUTPATIENT)
Dept: INFUSION THERAPY | Facility: CLINIC | Age: 66
End: 2021-12-27
Attending: INTERNAL MEDICINE
Payer: COMMERCIAL

## 2021-12-27 VITALS — DIASTOLIC BLOOD PRESSURE: 86 MMHG | SYSTOLIC BLOOD PRESSURE: 154 MMHG | HEART RATE: 94 BPM | TEMPERATURE: 98.5 F

## 2021-12-27 DIAGNOSIS — Z17.0 MALIGNANT NEOPLASM OF CENTRAL PORTION OF RIGHT BREAST IN FEMALE, ESTROGEN RECEPTOR POSITIVE (H): Primary | ICD-10-CM

## 2021-12-27 DIAGNOSIS — C50.111 MALIGNANT NEOPLASM OF CENTRAL PORTION OF RIGHT BREAST IN FEMALE, ESTROGEN RECEPTOR POSITIVE (H): Primary | ICD-10-CM

## 2021-12-27 PROCEDURE — 258N000003 HC RX IP 258 OP 636: Performed by: INTERNAL MEDICINE

## 2021-12-27 PROCEDURE — 96413 CHEMO IV INFUSION 1 HR: CPT

## 2021-12-27 PROCEDURE — 250N000011 HC RX IP 250 OP 636: Performed by: INTERNAL MEDICINE

## 2021-12-27 RX ORDER — HEPARIN SODIUM (PORCINE) LOCK FLUSH IV SOLN 100 UNIT/ML 100 UNIT/ML
5 SOLUTION INTRAVENOUS
Status: DISCONTINUED | OUTPATIENT
Start: 2021-12-27 | End: 2021-12-27 | Stop reason: HOSPADM

## 2021-12-27 RX ADMIN — SODIUM CHLORIDE 250 ML: 9 INJECTION, SOLUTION INTRAVENOUS at 13:37

## 2021-12-27 RX ADMIN — Medication 5 ML: at 14:11

## 2021-12-27 RX ADMIN — SODIUM CHLORIDE 112 MG: 9 INJECTION, SOLUTION INTRAVENOUS at 13:38

## 2021-12-27 NOTE — PROGRESS NOTES
Infusion Nursing Note:  Jackie Urias presents today for C5D1 Trazimera.    Patient seen by provider today: No   present during visit today: Not Applicable.    Note: N/A.      Intravenous Access:  Implanted Port.    Treatment Conditions:  Results reviewed, labs MET treatment parameters, ok to proceed with treatment.      Post Infusion Assessment:  Patient tolerated infusion without incident.  Blood return noted pre and post infusion.  Site patent and intact, free from redness, edema or discomfort.  No evidence of extravasations.  Access discontinued per protocol.       Discharge Plan:   Patient discharged in stable condition accompanied by: self.  Departure Mode: Ambulatory.  Pt is now receiving Trazimera only, which will be every 3 weeks.     Dalia Joel RN

## 2021-12-27 NOTE — PROGRESS NOTES
Telephone note    Call the patient to discuss with her her decision to discontinue paclitaxel therapy.  I again reminded her that this is a adjuvant therapy to minimize the risk of metastatic cancer showing up.  She however has made up her mind.  Would not want to change it in spite of all my recommendations.    We will change her therapy to Herceptin.

## 2022-01-17 ENCOUNTER — LAB (OUTPATIENT)
Dept: INFUSION THERAPY | Facility: CLINIC | Age: 67
End: 2022-01-17
Attending: INTERNAL MEDICINE
Payer: COMMERCIAL

## 2022-01-17 VITALS — BODY MASS INDEX: 20.48 KG/M2 | WEIGHT: 126.8 LBS

## 2022-01-17 VITALS
RESPIRATION RATE: 18 BRPM | SYSTOLIC BLOOD PRESSURE: 142 MMHG | TEMPERATURE: 98.3 F | DIASTOLIC BLOOD PRESSURE: 82 MMHG | HEART RATE: 81 BPM

## 2022-01-17 DIAGNOSIS — C50.111 MALIGNANT NEOPLASM OF CENTRAL PORTION OF RIGHT BREAST IN FEMALE, ESTROGEN RECEPTOR POSITIVE (H): Primary | ICD-10-CM

## 2022-01-17 DIAGNOSIS — Z17.0 MALIGNANT NEOPLASM OF CENTRAL PORTION OF RIGHT BREAST IN FEMALE, ESTROGEN RECEPTOR POSITIVE (H): Primary | ICD-10-CM

## 2022-01-17 LAB
BASOPHILS # BLD MANUAL: 0.4 10E3/UL (ref 0–0.2)
BASOPHILS NFR BLD MANUAL: 2 %
EOSINOPHIL # BLD MANUAL: 0.4 10E3/UL (ref 0–0.7)
EOSINOPHIL NFR BLD MANUAL: 2 %
ERYTHROCYTE [DISTWIDTH] IN BLOOD BY AUTOMATED COUNT: 12.9 % (ref 10–15)
HCT VFR BLD AUTO: 37.4 % (ref 35–47)
HGB BLD-MCNC: 12.4 G/DL (ref 11.7–15.7)
LYMPHOCYTES # BLD MANUAL: 10.9 10E3/UL (ref 0.8–5.3)
LYMPHOCYTES NFR BLD MANUAL: 61 %
MCH RBC QN AUTO: 31.6 PG (ref 26.5–33)
MCHC RBC AUTO-ENTMCNC: 33.2 G/DL (ref 31.5–36.5)
MCV RBC AUTO: 95 FL (ref 78–100)
MONOCYTES # BLD MANUAL: 1.1 10E3/UL (ref 0–1.3)
MONOCYTES NFR BLD MANUAL: 6 %
NEUTROPHILS # BLD MANUAL: 5.2 10E3/UL (ref 1.6–8.3)
NEUTROPHILS NFR BLD MANUAL: 29 %
PLAT MORPH BLD: ABNORMAL
PLATELET # BLD AUTO: 267 10E3/UL (ref 150–450)
RBC # BLD AUTO: 3.92 10E6/UL (ref 3.8–5.2)
RBC MORPH BLD: ABNORMAL
WBC # BLD AUTO: 17.8 10E3/UL (ref 4–11)

## 2022-01-17 PROCEDURE — 85027 COMPLETE CBC AUTOMATED: CPT | Performed by: INTERNAL MEDICINE

## 2022-01-17 PROCEDURE — 258N000003 HC RX IP 258 OP 636: Performed by: NURSE PRACTITIONER

## 2022-01-17 PROCEDURE — 250N000011 HC RX IP 250 OP 636: Performed by: INTERNAL MEDICINE

## 2022-01-17 PROCEDURE — 96365 THER/PROPH/DIAG IV INF INIT: CPT

## 2022-01-17 PROCEDURE — 250N000011 HC RX IP 250 OP 636: Performed by: NURSE PRACTITIONER

## 2022-01-17 RX ORDER — HEPARIN SODIUM (PORCINE) LOCK FLUSH IV SOLN 100 UNIT/ML 100 UNIT/ML
5 SOLUTION INTRAVENOUS
Status: DISCONTINUED | OUTPATIENT
Start: 2022-01-17 | End: 2022-01-17 | Stop reason: HOSPADM

## 2022-01-17 RX ADMIN — Medication 5 ML: at 12:24

## 2022-01-17 RX ADMIN — SODIUM CHLORIDE 338 MG: 9 INJECTION, SOLUTION INTRAVENOUS at 11:50

## 2022-01-17 NOTE — PROGRESS NOTES
Infusion Nursing Note:  Jackie CANALES Adonay presents today for Trazimera C6D1.    Patient seen by provider today: No   present during visit today: Not Applicable.    Note: N/A.    Intravenous Access:  Implanted Port.    Treatment Conditions:  ECHO/MUGA completed 11/12/2021  EF 60-65%.    Post Infusion Assessment:  Patient tolerated infusion without incident.  Blood return noted pre and post infusion.  Site patent and intact, free from redness, edema or discomfort.  No evidence of extravasations.  Access discontinued per protocol.     Discharge Plan:   Discharge instructions reviewed with: Patient.  Patient and/or family verbalized understanding of discharge instructions and all questions answered.  AVS to patient via [employer].  Patient will return 2/7/2022 for next appointment.   Patient discharged in stable condition accompanied by: self.  Departure Mode: Ambulatory.    Roselia Martinez RN

## 2022-02-07 ENCOUNTER — VIRTUAL VISIT (OUTPATIENT)
Dept: ONCOLOGY | Facility: HOSPITAL | Age: 67
End: 2022-02-07
Attending: NURSE PRACTITIONER
Payer: COMMERCIAL

## 2022-02-07 ENCOUNTER — LAB (OUTPATIENT)
Dept: INFUSION THERAPY | Facility: CLINIC | Age: 67
End: 2022-02-07
Attending: INTERNAL MEDICINE
Payer: COMMERCIAL

## 2022-02-07 ENCOUNTER — INFUSION THERAPY VISIT (OUTPATIENT)
Dept: INFUSION THERAPY | Facility: CLINIC | Age: 67
End: 2022-02-07
Attending: NURSE PRACTITIONER
Payer: COMMERCIAL

## 2022-02-07 VITALS
WEIGHT: 126 LBS | BODY MASS INDEX: 20.35 KG/M2 | RESPIRATION RATE: 16 BRPM | TEMPERATURE: 98.2 F | SYSTOLIC BLOOD PRESSURE: 125 MMHG | DIASTOLIC BLOOD PRESSURE: 84 MMHG | HEART RATE: 89 BPM

## 2022-02-07 DIAGNOSIS — Z17.0 MALIGNANT NEOPLASM OF CENTRAL PORTION OF RIGHT BREAST IN FEMALE, ESTROGEN RECEPTOR POSITIVE (H): ICD-10-CM

## 2022-02-07 DIAGNOSIS — Z51.81 ENCOUNTER FOR MONITORING CARDIOTOXIC DRUG THERAPY: ICD-10-CM

## 2022-02-07 DIAGNOSIS — C50.111 MALIGNANT NEOPLASM OF CENTRAL PORTION OF RIGHT BREAST IN FEMALE, ESTROGEN RECEPTOR POSITIVE (H): Primary | ICD-10-CM

## 2022-02-07 DIAGNOSIS — C50.111 MALIGNANT NEOPLASM OF CENTRAL PORTION OF RIGHT BREAST IN FEMALE, ESTROGEN RECEPTOR POSITIVE (H): ICD-10-CM

## 2022-02-07 DIAGNOSIS — Z17.0 MALIGNANT NEOPLASM OF CENTRAL PORTION OF RIGHT BREAST IN FEMALE, ESTROGEN RECEPTOR POSITIVE (H): Primary | ICD-10-CM

## 2022-02-07 DIAGNOSIS — Z79.899 ENCOUNTER FOR MONITORING CARDIOTOXIC DRUG THERAPY: ICD-10-CM

## 2022-02-07 LAB
BASOPHILS # BLD MANUAL: 0 10E3/UL (ref 0–0.2)
BASOPHILS NFR BLD MANUAL: 0 %
EOSINOPHIL # BLD MANUAL: 0.4 10E3/UL (ref 0–0.7)
EOSINOPHIL NFR BLD MANUAL: 2 %
ERYTHROCYTE [DISTWIDTH] IN BLOOD BY AUTOMATED COUNT: 12.6 % (ref 10–15)
HCT VFR BLD AUTO: 38.4 % (ref 35–47)
HGB BLD-MCNC: 12.6 G/DL (ref 11.7–15.7)
HOLD SPECIMEN: NORMAL
LYMPHOCYTES # BLD MANUAL: 14 10E3/UL (ref 0.8–5.3)
LYMPHOCYTES NFR BLD MANUAL: 67 %
MCH RBC QN AUTO: 31.3 PG (ref 26.5–33)
MCHC RBC AUTO-ENTMCNC: 32.8 G/DL (ref 31.5–36.5)
MCV RBC AUTO: 95 FL (ref 78–100)
MONOCYTES # BLD MANUAL: 0 10E3/UL (ref 0–1.3)
MONOCYTES NFR BLD MANUAL: 0 %
NEUTROPHILS # BLD MANUAL: 6.5 10E3/UL (ref 1.6–8.3)
NEUTROPHILS NFR BLD MANUAL: 31 %
PLAT MORPH BLD: ABNORMAL
PLATELET # BLD AUTO: 278 10E3/UL (ref 150–450)
RBC # BLD AUTO: 4.03 10E6/UL (ref 3.8–5.2)
RBC MORPH BLD: ABNORMAL
WBC # BLD AUTO: 20.9 10E3/UL (ref 4–11)

## 2022-02-07 PROCEDURE — 250N000011 HC RX IP 250 OP 636: Performed by: INTERNAL MEDICINE

## 2022-02-07 PROCEDURE — 99213 OFFICE O/P EST LOW 20 MIN: CPT | Mod: 95 | Performed by: NURSE PRACTITIONER

## 2022-02-07 PROCEDURE — 250N000011 HC RX IP 250 OP 636: Performed by: NURSE PRACTITIONER

## 2022-02-07 PROCEDURE — 96413 CHEMO IV INFUSION 1 HR: CPT

## 2022-02-07 PROCEDURE — 258N000003 HC RX IP 258 OP 636: Performed by: INTERNAL MEDICINE

## 2022-02-07 PROCEDURE — 85027 COMPLETE CBC AUTOMATED: CPT | Performed by: NURSE PRACTITIONER

## 2022-02-07 PROCEDURE — 36591 DRAW BLOOD OFF VENOUS DEVICE: CPT

## 2022-02-07 PROCEDURE — 258N000003 HC RX IP 258 OP 636: Performed by: NURSE PRACTITIONER

## 2022-02-07 RX ORDER — LOPERAMIDE HCL 2 MG
2 CAPSULE ORAL 4 TIMES DAILY PRN
COMMUNITY

## 2022-02-07 RX ORDER — HEPARIN SODIUM (PORCINE) LOCK FLUSH IV SOLN 100 UNIT/ML 100 UNIT/ML
5 SOLUTION INTRAVENOUS
Status: DISCONTINUED | OUTPATIENT
Start: 2022-02-07 | End: 2022-02-07 | Stop reason: HOSPADM

## 2022-02-07 RX ADMIN — Medication 5 ML: at 15:10

## 2022-02-07 RX ADMIN — SODIUM CHLORIDE 338 MG: 9 INJECTION, SOLUTION INTRAVENOUS at 14:35

## 2022-02-07 RX ADMIN — SODIUM CHLORIDE 250 ML: 9 INJECTION, SOLUTION INTRAVENOUS at 14:09

## 2022-02-07 NOTE — PROGRESS NOTES
[facility] Hematology and Oncology Progress Note    Patient: Jackie Urias  MRN: [id number]  Date of Service: Feb 7, 2022          Reason for Visit    Chief Complaint   Patient presents with     Oncology Clinic Visit     Return Malignant neoplasm of central portion of right breast in female, estrogen receptor positive, review labs        Assessment and Plan    Cancer Staging  Malignant neoplasm of central portion of right breast in female, estrogen receptor positive (H)  Staging form: Breast, AJCC 8th Edition  - Pathologic stage from 10/5/2021: Stage IA (pT1c, pN0, cM0, G2, ER+, CA+, HER2+) - Signed by Gris Benjamin APRN CNP on 11/15/2021    1.  Breast cancer, right side, stage I: Patient is triple positive and started adjuvant chemo with Herceptin and Taxol.  She had a very severe reaction to Taxol quickly into the infusion and we stopped it and gave extra premeds.  She felt horrible for about 24 hours. We did not feel that it is safe to continue on Taxol so we started weekly Abraxane instead of Taxol.  She only got 2 weeks of that and then was having side effects so refused to have any more chemo. Is not just on herceptin. Will continue that to complete one year. Started in November 2021. She will be seen every 6 weeks.   Will need echo every 3-4 months. Will get at the end of February. Try to do in [place] if possible.   Is going to get plastic surgery in March.     2.  Incidental finding of CLL on her lymph nodes: Her white blood cell count is elevated.  The majority is lymphocytes.  Her hemoglobin and platelets are normal.  Continue to monitor her blood counts.    ECOG Performance    0 - Independent    Distress Screening (within last 30 days)    No data recorded     Pain       Problem List    Patient Active Problem List   Diagnosis     Breast cancer, stage 2 (H)     Fibromyalgia     Atopic dermatitis     Ductal carcinoma in situ (DCIS) of right breast     Family history of breast cancer in male      History of breast implant or prosthesis     History of left breast cancer     Malignant neoplasm of central portion of right breast in female, estrogen receptor positive (H)     CLL (chronic lymphocytic leukemia) (H)        ______________________________________________________________________________    History of Present Illness    Ms. Jackie Urias is a 66 year old postmenopausal woman who has been diagnosed with right-sided breast cancer located at 11 o'clock position presenting as focal asymmetry on the mammogram in August 2021.  This was on a routine mammogram screening timeframe.  She did not have any palpable lesion.  Diagnostic imaging revealed that she had a 3.6 cm mass at the position along with some other calcification.  She underwent biopsy which confirmed invasive ductal carcinoma ER positive MD positive HER-2 new positive by FISH.  She then also underwent biopsy of the right axillary lymph node which was negative for any metastatic carcinoma.     She underwent surgery in 5 October 2021 in the form of bilateral mastectomies with right sentinel lymph node biopsy and reconstruction bilaterally with tissue expander placement.  Pathology revealed invasive ductal carcinoma grade 2, 11 mm in size extensive DCIS no lymphovascular invasion.  Final stage I, T1c N0 M0.     Interestingly her lymph nodes did show some evidence of CLL, she is a new diagnosis for her as well.     She is a prior history of left-sided breast cancer diagnosed in 24 August 2019 when she was 52 years old.  This was also ER positive MD positive and HER-2/chapin 3+.  She had surgery in 3 September 2009 in the form of lumpectomy and sentinel lymph node biopsy.  1.2 cm tumor.  1 lymph node positive.  14 additional lymph nodes were negative.     Following that she was treated with dose dense Adriamycin and cyclophosphamide while followed by weekly paclitaxel and Herceptin.  She also got radiation therapy and tamoxifen for 5 years.     She  also was found to have a DCIS in the right breast intermediate grade ER positive in September 2018.  She underwent right breast partial mastectomy on 19 October 2018.  This was a 3.2 cm DCIS with positive margin.  No invasive carcinoma was noted.  She had reexcision on 25 October 2018 which did show small amount of residual DCIS and the margins were still not clear.     She underwent reexcision on 8 January 2019 by Dr. Cox which was negative for DCIS or any invasive carcinoma.     She has undergone genetic testing and it is still pending.     She saw Dr. Powers in our clinic and he recommended that she do adjuvant chemotherapy due to the size of the tumor and that it is HER-2 positive.  She was agreeable to that so she started Taxol and Herceptin..  She was reluctant to do treatment but thought it was best to do it.  She had a pretty severe reaction to the Taxol after only getting about 25 cc.  She had breathing issues.  She was turning red.  She was having chest heaviness and tightness.  Patient and I did not feel it was safe trying to rechallenge.  She was then agreeable to try abraxane, even though she was quite anxious about it. Did get 2 weeks of that and had a lot of side effects including diarrhea, gastritis, anxiety. She declined to continue so has just been on herceptin. Tolerating that pretty well. Mild fatigue, intermittent diarrhea, but much better than when she was on chemo.  No constitutional symptoms.      Review of Systems    Pertinent items are noted in HPI.    Past History    Past Medical History:   Diagnosis Date     Cancer (H)      Fibromyalgia      GERD (gastroesophageal reflux disease)      Hernia, hiatal        PHYSICAL EXAM  There were no vitals taken for this visit.    GENERAL: no acute distress. Cooperative in conversation. Alone on video, in [place] cancer clinic. Mask on  RESP: Regular respiratory rate. No expiratory wheezes   NEURO: non focal. Alert and oriented x3.   PSYCH: within  normal limits. No depression or anxiety.  SKIN: exposed skin is dry intact.       Lab Results    Recent Results (from the past 168 hour(s))   CBC with platelets and differential   Result Value Ref Range    WBC Count      RBC Count 4.03 3.80 - 5.20 10e6/uL    Hemoglobin 12.6 11.7 - 15.7 g/dL    Hematocrit 38.4 35.0 - 47.0 %    MCV 95 78 - 100 fL    MCH 31.3 26.5 - 33.0 pg    MCHC 32.8 31.5 - 36.5 g/dL    RDW 12.6 10.0 - 15.0 %    Platelet Count 278 150 - 450 10e3/uL       Imaging    No results found.    Video-Visit Details    Type of service:  Video Visit  Video Start Time: 1:43    Video End Time (time video stopped): 1:50  Originating Location (pt. Location): [place] [facility]    Distant Location (provider location):  [facility] CANCER [facility]     Platform used for Video Visit: [employer]     Total time spent with patient in face to face time, chart review and documentation was 20 minutes.        Signed by: VERENICE Frye CNP

## 2022-02-07 NOTE — PROGRESS NOTES
"Visit visit-  Oncology Rooming Note send link to [email or web address]    February 7, 2022 1:33 PM   Jackie Urias is a 66 year old female who presents for:    Chief Complaint   Patient presents with     Oncology Clinic Visit     Return Malignant neoplasm of central portion of right breast in female, estrogen receptor positive, review labs      Initial Vitals: There were no vitals taken for this visit. Estimated body mass index is 20.48 kg/m  as calculated from the following:    Height as of 11/22/21: 1.676 m (5' 5.98\").    Weight as of 1/17/22: 57.5 kg (126 lb 12.8 oz). There is no height or weight on file to calculate BSA.  Data Unavailable Comment: Data Unavailable   No LMP recorded.  Allergies reviewed: Yes  Medications reviewed: Yes    Medications: Medication refills not needed today.  Pharmacy name entered into [employer]: [id number] - [place], [place] - [address]    Clinical concerns: Return Malignant neoplasm of central portion of right breast in female, estrogen receptor positive, review labs.       Erika Hilton CMA              "

## 2022-02-07 NOTE — PROGRESS NOTES
Infusion Nursing Note:  Jackie Urias presents today for Trazimera.    Patient seen by provider today: Yes: Dr. Powers video visit   present during visit today: Not Applicable.    Note: No new reported medical concerns.      Intravenous Access:  Implanted Port.    Treatment Conditions:  Lab Results   Component Value Date    HGB 12.6 02/07/2022    WBC 20.9 (H) 02/07/2022    ANEU 6.5 02/07/2022     02/07/2022      Lab Results   Component Value Date    BILITOTAL 0.3 12/06/2021    ALBUMIN 3.4 12/06/2021    ALT 17 12/06/2021    AST 8 12/06/2021     Results reviewed, labs MET treatment parameters, ok to proceed with treatment.  ECHO/MUGA completed 11/12/21  EF 60-65%.      Post Infusion Assessment:  Patient tolerated infusion without incident.  Blood return noted pre and post infusion.  No evidence of extravasations.  Access discontinued per protocol.       Discharge Plan:   Discharge instructions reviewed with: Patient.  Patient and/or family verbalized understanding of discharge instructions and all questions answered.  Patient discharged in stable condition accompanied by: self.  Departure Mode: Ambulatory.      Shea Downs RN

## 2022-02-10 ENCOUNTER — TELEPHONE (OUTPATIENT)
Dept: ONCOLOGY | Facility: HOSPITAL | Age: 67
End: 2022-02-10
Payer: COMMERCIAL

## 2022-02-10 NOTE — TELEPHONE ENCOUNTER
Critical Illness Claim Form has been filled out, signed and given back to Cora via e-mail.    I call her to let her know that it has been completed. She verbalized understanding and appreciation of our conversation.     She will let me know if there is anything else I can assist with.     Donna Bhatti RN Care Coordinator  [facility]

## 2022-02-21 ENCOUNTER — HOSPITAL ENCOUNTER (OUTPATIENT)
Dept: CARDIOLOGY | Facility: CLINIC | Age: 67
Discharge: HOME | End: 2022-02-21
Attending: NURSE PRACTITIONER | Admitting: NURSE PRACTITIONER
Payer: COMMERCIAL

## 2022-02-21 DIAGNOSIS — Z79.899 ENCOUNTER FOR MONITORING CARDIOTOXIC DRUG THERAPY: ICD-10-CM

## 2022-02-21 DIAGNOSIS — C50.111 MALIGNANT NEOPLASM OF CENTRAL PORTION OF RIGHT BREAST IN FEMALE, ESTROGEN RECEPTOR POSITIVE (H): ICD-10-CM

## 2022-02-21 DIAGNOSIS — Z51.81 ENCOUNTER FOR MONITORING CARDIOTOXIC DRUG THERAPY: ICD-10-CM

## 2022-02-21 DIAGNOSIS — Z17.0 MALIGNANT NEOPLASM OF CENTRAL PORTION OF RIGHT BREAST IN FEMALE, ESTROGEN RECEPTOR POSITIVE (H): ICD-10-CM

## 2022-02-21 LAB — LVEF ECHO: NORMAL

## 2022-02-21 PROCEDURE — 93321 DOPPLER ECHO F-UP/LMTD STD: CPT | Mod: 26 | Performed by: INTERNAL MEDICINE

## 2022-02-21 PROCEDURE — 93325 DOPPLER ECHO COLOR FLOW MAPG: CPT

## 2022-02-21 PROCEDURE — 93325 DOPPLER ECHO COLOR FLOW MAPG: CPT | Mod: 26 | Performed by: INTERNAL MEDICINE

## 2022-02-21 PROCEDURE — 93308 TTE F-UP OR LMTD: CPT

## 2022-02-21 PROCEDURE — 93308 TTE F-UP OR LMTD: CPT | Mod: 26 | Performed by: INTERNAL MEDICINE

## 2022-02-22 ENCOUNTER — TELEPHONE (OUTPATIENT)
Dept: ONCOLOGY | Facility: HOSPITAL | Age: 67
End: 2022-02-22
Payer: COMMERCIAL

## 2022-02-22 NOTE — TELEPHONE ENCOUNTER
Cora was called per request of Gris Benjamin CNP to let her know that her echocardiogram looks good.  It is stable and there is no changes since her previous one in November 2021.  She was happy to hear this.    Maegan Zapien RN

## 2022-02-28 ENCOUNTER — INFUSION THERAPY VISIT (OUTPATIENT)
Dept: INFUSION THERAPY | Facility: CLINIC | Age: 67
End: 2022-02-28
Attending: INTERNAL MEDICINE
Payer: COMMERCIAL

## 2022-02-28 VITALS
DIASTOLIC BLOOD PRESSURE: 84 MMHG | SYSTOLIC BLOOD PRESSURE: 135 MMHG | HEART RATE: 79 BPM | WEIGHT: 125 LBS | TEMPERATURE: 97 F | BODY MASS INDEX: 20.19 KG/M2

## 2022-02-28 DIAGNOSIS — C50.111 MALIGNANT NEOPLASM OF CENTRAL PORTION OF RIGHT BREAST IN FEMALE, ESTROGEN RECEPTOR POSITIVE (H): Primary | ICD-10-CM

## 2022-02-28 DIAGNOSIS — Z17.0 MALIGNANT NEOPLASM OF CENTRAL PORTION OF RIGHT BREAST IN FEMALE, ESTROGEN RECEPTOR POSITIVE (H): Primary | ICD-10-CM

## 2022-02-28 PROCEDURE — 250N000011 HC RX IP 250 OP 636: Performed by: NURSE PRACTITIONER

## 2022-02-28 PROCEDURE — 96413 CHEMO IV INFUSION 1 HR: CPT

## 2022-02-28 PROCEDURE — 258N000003 HC RX IP 258 OP 636: Performed by: NURSE PRACTITIONER

## 2022-02-28 PROCEDURE — 250N000011 HC RX IP 250 OP 636: Performed by: INTERNAL MEDICINE

## 2022-02-28 RX ORDER — HEPARIN SODIUM (PORCINE) LOCK FLUSH IV SOLN 100 UNIT/ML 100 UNIT/ML
5 SOLUTION INTRAVENOUS
Status: DISCONTINUED | OUTPATIENT
Start: 2022-02-28 | End: 2022-02-28 | Stop reason: HOSPADM

## 2022-02-28 RX ADMIN — SODIUM CHLORIDE 338 MG: 9 INJECTION, SOLUTION INTRAVENOUS at 11:59

## 2022-02-28 RX ADMIN — Medication 5 ML: at 12:33

## 2022-02-28 NOTE — PROGRESS NOTES
Infusion Nursing Note:  Jackie Urias presents today for Trazimera.    Patient seen by provider today: No   present during visit today: Not Applicable.    Note: N/A.    Intravenous Access:  Implanted Port.    Treatment Conditions:  No labs needed.  Pt has an up to date echocardiogram that is WNL.      Post Infusion Assessment:  Patient tolerated infusion without incident.  Access discontinued per protocol.       Discharge Plan:   Patient discharged in stable condition accompanied by: self.  Departure Mode: Ambulatory.  Pt has appt for next treatment on 3/21.     Manisha De Anda RN

## 2022-04-27 ENCOUNTER — TELEPHONE (OUTPATIENT)
Dept: ONCOLOGY | Facility: HOSPITAL | Age: 67
End: 2022-04-27
Payer: COMMERCIAL

## 2022-04-27 ENCOUNTER — PATIENT OUTREACH (OUTPATIENT)
Dept: ONCOLOGY | Facility: HOSPITAL | Age: 67
End: 2022-04-27
Payer: COMMERCIAL

## 2022-04-27 NOTE — TELEPHONE ENCOUNTER
Patient calls in today and leaves message on the  voicemail.  She states that she submitted paperwork from [employer] a month or more ago.  She states that she has not heard anything nor has [employer] and now they need this within 7 days.  She would like to be contacted immediately and plans to stop and pick these papers up today.  This information will be routed over to the RN care coordinator.    Maegan Zapien RN

## 2022-04-27 NOTE — PROGRESS NOTES
JESÚS on  regarding this message left on the triage line:    Patient calls in today and leaves message on the  voicemail.  She states that she submitted paperwork from [employer] a month or more ago.  She states that she has not heard anything nor has [employer] and now they need this within 7 days.  She would like to be contacted immediately and plans to stop and pick these papers up today.  This information will be routed over to the RN care coordinator.     Maegan Zapien RN    Patient called back.  The paperwork she is requesting is medical records to be faxed to [email or web address].  I faxed to HIM the request on 04/14/22 as a second request.  Today I re-faxed to HIM as a third request and also gave the HIM phone number to the patient [phone].

## 2023-01-14 ENCOUNTER — HEALTH MAINTENANCE LETTER (OUTPATIENT)
Age: 68
End: 2023-01-14

## 2023-02-03 PROBLEM — Z17.0 MALIGNANT NEOPLASM OF CENTRAL PORTION OF RIGHT BREAST IN FEMALE, ESTROGEN RECEPTOR POSITIVE (H): Status: ACTIVE | Noted: 2021-11-04

## 2023-02-03 PROBLEM — C50.111 MALIGNANT NEOPLASM OF CENTRAL PORTION OF RIGHT BREAST IN FEMALE, ESTROGEN RECEPTOR POSITIVE (H): Status: ACTIVE | Noted: 2021-11-04

## 2023-04-23 ENCOUNTER — HEALTH MAINTENANCE LETTER (OUTPATIENT)
Age: 68
End: 2023-04-23

## 2024-02-11 ENCOUNTER — HEALTH MAINTENANCE LETTER (OUTPATIENT)
Age: 69
End: 2024-02-11

## 2024-06-30 ENCOUNTER — HEALTH MAINTENANCE LETTER (OUTPATIENT)
Age: 69
End: 2024-06-30

## 2025-03-08 ENCOUNTER — HEALTH MAINTENANCE LETTER (OUTPATIENT)
Age: 70
End: 2025-03-08

## (undated) RX ORDER — HEPARIN SODIUM (PORCINE) LOCK FLUSH IV SOLN 100 UNIT/ML 100 UNIT/ML
SOLUTION INTRAVENOUS
Status: DISPENSED
Start: 2021-11-09

## (undated) RX ORDER — LIDOCAINE HYDROCHLORIDE AND EPINEPHRINE 10; 10 MG/ML; UG/ML
INJECTION, SOLUTION INFILTRATION; PERINEURAL
Status: DISPENSED
Start: 2021-11-09

## (undated) RX ORDER — ONDANSETRON 2 MG/ML
INJECTION INTRAMUSCULAR; INTRAVENOUS
Status: DISPENSED
Start: 2021-11-09

## (undated) RX ORDER — LIDOCAINE HYDROCHLORIDE 10 MG/ML
INJECTION, SOLUTION INFILTRATION; PERINEURAL
Status: DISPENSED
Start: 2021-11-09

## (undated) RX ORDER — FENTANYL CITRATE 50 UG/ML
INJECTION, SOLUTION INTRAMUSCULAR; INTRAVENOUS
Status: DISPENSED
Start: 2021-11-09